# Patient Record
Sex: MALE | Race: WHITE | NOT HISPANIC OR LATINO | Employment: OTHER | URBAN - METROPOLITAN AREA
[De-identification: names, ages, dates, MRNs, and addresses within clinical notes are randomized per-mention and may not be internally consistent; named-entity substitution may affect disease eponyms.]

---

## 2017-01-03 DIAGNOSIS — G89.29 CHRONIC PAIN OF LEFT KNEE: ICD-10-CM

## 2017-01-03 DIAGNOSIS — R29.898 WEAKNESS OF LEFT LOWER EXTREMITY: Primary | ICD-10-CM

## 2017-01-03 DIAGNOSIS — M25.562 CHRONIC PAIN OF LEFT KNEE: ICD-10-CM

## 2017-01-03 DIAGNOSIS — R26.2 DIFFICULTY WALKING: ICD-10-CM

## 2017-01-03 DIAGNOSIS — Z96.652 S/P REVISION OF TOTAL KNEE, LEFT: ICD-10-CM

## 2017-01-09 ENCOUNTER — PATIENT MESSAGE (OUTPATIENT)
Dept: ORTHOPEDICS | Facility: CLINIC | Age: 57
End: 2017-01-09

## 2017-01-27 ENCOUNTER — CLINICAL SUPPORT (OUTPATIENT)
Dept: REHABILITATION | Facility: HOSPITAL | Age: 57
End: 2017-01-27
Attending: ORTHOPAEDIC SURGERY
Payer: OTHER MISCELLANEOUS

## 2017-01-27 DIAGNOSIS — Z74.09 IMPAIRED FUNCTIONAL MOBILITY, BALANCE, GAIT, AND ENDURANCE: ICD-10-CM

## 2017-01-27 DIAGNOSIS — G89.29 CHRONIC PAIN OF LEFT KNEE: ICD-10-CM

## 2017-01-27 DIAGNOSIS — M25.562 CHRONIC PAIN OF LEFT KNEE: ICD-10-CM

## 2017-01-27 DIAGNOSIS — Z96.652 S/P REVISION OF TOTAL KNEE, LEFT: ICD-10-CM

## 2017-01-27 DIAGNOSIS — R26.2 DIFFICULTY WALKING: ICD-10-CM

## 2017-01-27 DIAGNOSIS — R53.1 DECREASED STRENGTH: ICD-10-CM

## 2017-01-27 DIAGNOSIS — R29.898 WEAKNESS OF LEFT LOWER EXTREMITY: ICD-10-CM

## 2017-01-27 DIAGNOSIS — M25.662 DECREASED ROM OF LEFT KNEE: ICD-10-CM

## 2017-01-27 DIAGNOSIS — Z78.9 IMPAIRED MOTOR CONTROL: ICD-10-CM

## 2017-01-27 PROCEDURE — 97163 PT EVAL HIGH COMPLEX 45 MIN: CPT | Mod: PO | Performed by: PHYSICAL THERAPIST

## 2017-01-27 NOTE — PROGRESS NOTES
OUTPATIENT NEUROLOGICAL REHABILITATION  PHYSICAL THERAPY EVALUATION    Name: John Macedo  Clinic Number: [id number]    Diagnosis:   Encounter Diagnoses   Name Primary?    Weakness of left lower extremity     S/P revision of total knee, left     Chronic pain of left knee     Difficulty walking      Physician: Alvarez Garcia MD  Treatment Orders: PT Eval and Treat, Bioness trial/ assessment  Past Medical History   Diagnosis Date    Arthritis     GERD (gastroesophageal reflux disease)        Evaluation Date: 1/27/2017  Visit #: 1  Plan of care expiration: 4/21/2017  Precautions: universal      History   Medical Diagnosis: weakness of LLE, difficulty walking, s/p left TKA  PT Diagnosis: impaired gait, decreased strength, decreased ROM, impaired balance, impaired motor control  Chief complaint: impaired ambulation, weakness of LLE, decreased muscular endurance of LLE.  History of Present Illness: John is a 56 y.o. male that presents to [facility] secondary to difficulty regaining strength and returning to normal ambulation s/p left TKA. Left TKA 2016, pulled out pain pump then had significant LE edema.  Pt reports LLE weakness and impaired ambulation as a result. Per pt, EMG reading demonstrates regeneration. Left knee often gives out causing falls. History of falls 1x/day. Pt reports he feels as though leg is weaker. PMHx: Randolph TKA left ~10 yrs ago. Right sided SI joint pain constant pain. ORIF left wrist 2009, facial reconstruction- done in teenage yrs.    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 Works ~5 hrs/day      Subjective   Pt stated goals: "get back to where I was"  Family present/states: "at least stop the pain"  Pain: 7-8/10 LBP and left knee pain    Objective   - Follows commands: yes   - Speech: no deficits     Mental status: alert, oriented to person, place, and time  Appearance: Casually dressed  Behavior:  calm and cooperative  Attention Span and Concentration:  Normal    Dominant hand:  right     Posture Alignment :slouched posture    Sensation:  Light Touch: Intact           Proprioception:   Intact    Tone: 0 - No increase in muscle tone  Limbs/muscles affected: NA    Visual/Auditory: denies changes     Coordination:   - fine motor: NT  - UE coordination: NT    - LE coordination:  WFL for alternate toe taps    ROM:   UPPER EXTREMITY--AROM/PROM  (R) UE: WFLs  (L) UE: WFLs           RANGE OF MOTION--LOWER EXTREMITIES  (R) LE Hip: equal bilaterally   Knee: knee flex in sitting- 123 degrees   Ankle: equal bilaterally    (L) LE: Hip: equal bilaterally   Knee: knee flex in sitting- 85 degrees   Ankle: equal bilaterally    Strength: manual muscle test grades below   Upper Extremity Strength  BUE strength grossly WFLs  Lower Extremity Strength   RLE LLE   Hip Flexion: 4+/5 4/5   Hip Extension:  3+/5 3+/5   Hip Abduction: 5/5 4+/5   Hip Adduction: NT NT   Knee Extension: 5/5 3+/5   Knee Flexion: 5/5 4-/5   Ankle Dorsiflexion: 5/5 5/5   Ankle Plantarflexion: 5/5 5/5   Ankle Inversion: 5/5 5/5   Ankle Eversion: 5/5 5/5        Evaluation   Single Limb Stance R LE 30 sec, steady, light touch on crutch  (<10 sec = HIGH FALL RISK)   Single Limb Stance L LE 30 sec, unsteady, releant on crutch  (<10 sec = HIGH FALL RISK)   30 second Chair Rise NT   5 times sit-stand NT "     Postural control:  static standing balance without support= good  Dynamic standing balance without support= fair  ambulation with 1 Lofstrand= fair    Gait Assessment:   - AD used: 1 loftstrand  - Assistance: mod I  - Distance: ~ ft limit  - Curb: NT  - Ramp:  NT    GAIT DEVIATIONS:  John displays the following deviations with ambulation: por VMO activation initial- mid stance on left, decreased weight bearing LLE, decreased speed, decreased terminal stance on left  Gait with Bioness L300 Plus: improved speed, improved left knee extension in initial contact through mid stance, improved terminal stance on left    Impairments contributing to deviations: impaired motor control, decreased strength, decreased muscular endurance    Endurance Deficit: good for eval, per pt LLE fatigue is evident and worsens throughout the day affecting gait and balance.      Evaluation Bioness L300 Plus   Timed Up and Go 14.17 sec 12.15 sec with 1 loftstrand    Self Selected Walking Speed 0.8 m/sec (6m/7.48s) 0.99 m/s (6m in 6.07 sec)   Fast Walking Speed 0.88 m/sec (6m/6.8s) 1.22 m/s (6m in 4.9 sec)     Functional Mobility (Bed mobility, transfers)  Bed mobility: I  Supine to sit: I  Sit to supine: I  Rolling: I  Transfers to bed: Mod I  Transfers to toilet: Mod I  Sit to stand:  Mod I  Stand pivot:  Mod I  Car transfers: Mod I  Wheelchair mobility: NA  Floor transfers: NT    Written Home Exercises Provided: TBA- pt is expecting to obtain a Bioness L300 Plus unit for home use    Education provided re:role of PT, goals for PT, scheduling - pt verbalized understanding.     John verbalized good understanding of education provided.   Pt has no cultural, educational or language barriers to learning provided.      Assessment   This is a 56 y.o. male referred to outpatient physical therapy and presents with a medical diagnosis of LLE weakness and gait impairments s/p left TKA and demonstrates limitations as described in the  problem list. Due to pt's deficits, pt is currently unable to perform job duties as a , is limited to working 5 hrs or less per day, uses an AD for ambulation, and experiences limited ambulation tolerance. Pt has a history of complications occurring after knee replacement that include swelling and possible nerve damage. Pt expresses anxiety regarding rehab and returning to PLOF. pt has participated in PT in the past with limited gains. Pt reports he feels as though condition is worsening. Pt also reports fluctuating status of ambulation with intermittent left knee buckling resulting in loss of balance. Pt's condition is considered unstable. PT is warranted to address impairments via use of [employer] L300 Plus system to improve gait consistency and decrease need for AD.    Pt rehab potential is Good. Pt will benefit from continuing skilled outpatient physical therapy to address the deficits listed below in the problem list, provide pt/family education and to maximize pt's level of independence in the home and community environment.       Medical necessity is demonstrated by the following IMPAIRMENTS/PROMBLEM LIST:   1. Fall Risk - impaired balance   2. Weakness   3. Decreased ROM  4. Gait deviations   5. Decreased ambulation   6. Decreased activity tolerance   7. Difficulty participating in daily activities   8. Continued inability to participate fully in vocational pursuits   9. Requires skilled supervision to complete and progress HEP     Anticipated barriers to physical therapy: PMHx, history of previous PT, anxiety about returning to PLOF    Pt's spiritual, cultural and educational needs considered and pt agreeable to plan of care and goals as stated below:     GOALS:   Short term goals: 4-6 weeks, pt agrees to goals set.  1. Pt will perform self ROM for knee flexion to improve mobility to correct gait impairments.  2. Pt will demonstrate improved left knee flexion AAROM in sitting to 95 degrees to  improve LE mobility for mobility (i.e. Sit<>stand, stairs).  3. Pt will perform TUG with Bioness L300 Plus and Lofstrand crutch in 10 sec to demonstrate improved mobility and decreased fall risk.   4. Pt will demonstrate improved bilateral hip extension strength to 4/5 to improve balance and ambulation.  5. Pt will report decreased left knee pain average to 5/10 with weight bearing to improve tolerance to mobility.  6. Assess ability to negotiate curbs and ramps and set goals as needed.     Long term goals: 8-12 weeks, pt agrees to goals set  7. 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  8. Pt will demonstrate improved ambulation velocity to 1.24 m/s to demonstrate a 25% improvement in speed/ mobility.   9. Pt will ambulate at least 300 ft with Bioness L300 Plus with or without Lofstrand to demonstrate improved ability to negotiate community distances.  10. Pt will demonstrate improved left knee strength to 4+/5 to improve all mobility, decrease risk of knee buckling, and decrease need for AD for ambulation.  11. Pt will report decreased left knee pain average to 3/10 or less to demonstrate improved tolerance to all mobility.      Plan   Outpatient physical therapy 2-3 times weekly for 8-12 weeks to include: Pt Education, HEP, therapeutic exercises, gait training, neuromuscular re-education, therapeutic activities, manual therapy, joint mobilizations, and modalities PRN to achieve established goals. Pt may be seen by PTA as part of the rehabilitation team.       La Pickens, PT  01/27/2017      I certify the need for these services furnished under this plan of treatment and while under my care.  ____________________________________ Physician/Referring Practitioner   Date of Signature

## 2017-01-31 ENCOUNTER — CLINICAL SUPPORT (OUTPATIENT)
Dept: REHABILITATION | Facility: HOSPITAL | Age: 57
End: 2017-01-31
Attending: ORTHOPAEDIC SURGERY
Payer: OTHER MISCELLANEOUS

## 2017-01-31 DIAGNOSIS — M25.662 DECREASED ROM OF LEFT KNEE: ICD-10-CM

## 2017-01-31 DIAGNOSIS — Z74.09 IMPAIRED FUNCTIONAL MOBILITY, BALANCE, GAIT, AND ENDURANCE: ICD-10-CM

## 2017-01-31 DIAGNOSIS — R53.1 DECREASED STRENGTH: ICD-10-CM

## 2017-01-31 DIAGNOSIS — Z78.9 IMPAIRED MOTOR CONTROL: ICD-10-CM

## 2017-01-31 PROCEDURE — 97116 GAIT TRAINING THERAPY: CPT | Mod: PO | Performed by: PHYSICAL THERAPIST

## 2017-01-31 PROCEDURE — 97110 THERAPEUTIC EXERCISES: CPT | Mod: PO | Performed by: PHYSICAL THERAPIST

## 2017-01-31 NOTE — PROGRESS NOTES
Physical Therapy Progress Note     Name: John Macedo  Clinic Number: [id number]  Diagnosis:   Weakness of left lower extremity       S/P revision of total knee, left      Chronic pain of left knee      Difficulty walking        Physician: Alvarez Garcia MD  Treatment Orders: PT Eval and Treat, Bioness trial/ assessment  Past Medical History   Diagnosis Date    Arthritis     GERD (gastroesophageal reflux disease)        Precautions: standard  Visit #: 2  Date of Eval: 1/27/2017  Plan of Care Expiration: 4/21/2017        Subjective   Pt reports: no new complaints. Pt scheduled with 2nd pt also using Bioness- only used 1/2 of sesssion  Pain Scale:  Same- 7/10 left knee with weight bearing    Objective     Patient received individual therapy to increase strength, endurance, ROM, flexibility and ambualtion with activities as follows:     Pt participated in gait training x 20 minutes to improve gait technique and consistency:  Bioness L300 Plus training= 182 steps with 1 loft strand with supervision- mod I    Pt participated in therapeutic exercises x 25 minutes to improve motor control and strength to improve gait:   Left SLR with intermittent use of 4# with NMES L-38 (4 pads) PPR 1- x 10 minutes  Prone HS curls 2# 20x  Prone hip ext 2# 20x      Written Home Exercises: to be provided- will review wall slides to improve left knee flexion    Education provided re: POC, HEP    Pt has no cultural, educational or language barriers to learning provided.    Assessment   John tolerated treatment well. He was able to complete more steps today with use of Bioness L300 Plus unit. Bioness device was not available for performing training modes, but training mode was set. NMES unit was used to assist pt with terminal left knee extension for SLR. Pt also participated in strengthening of hip ext and knee flex on left for improved gait and balance. Pt can benefit from  continued use of Bioness L300 plus for gait and LE strengthening to allo pt to return to PLOF and decrease need for AD.     Pt prognosis is Good. Pt will continue to benefit from skilled outpatient physical therapy to address the deficits listed in the problem list, provide pt/family education and to maximize pt's level of independence in the home and community environment.     Anticipated barriers to physical therapy: PMHx, history of previous PT, anxiety about returning to PLOF    Medical necessity is demonstrated by the following IMPAIRMENTS/PROBLEM LIST:   Fall Risk - impaired balance   Weakness   Decreased ROM  Gait deviations   Decreased ambulation   Decreased activity tolerance   Difficulty participating in daily activities  Continued inability to participate fully in vocational pursuits   Requires skilled supervision to complete and progress HEP      Pt's spiritual, cultural and educational needs considered and pt agreeable to plan of care and GOALS as stated below:   Short term goals: 4-6 weeks, pt agrees to goals set.  1. Pt will perform self ROM for knee flexion to improve mobility to correct gait impairments.  2. Pt will demonstrate improved left knee flexion AAROM in sitting to 95 degrees to improve LE mobility for mobility (i.e. Sit<>stand, stairs).  3. Pt will perform TUG with Bioness L300 Plus and Lofstrand crutch in 10 sec to demonstrate improved mobility and decreased fall risk.   4. Pt will demonstrate improved bilateral hip extension strength to 4/5 to improve balance and ambulation.  5. Pt will report decreased left knee pain average to 5/10 with weight bearing to improve tolerance to mobility.  6. Assess ability to negotiate curbs and ramps and set goals as needed.      Long term goals: 8-12 weeks, pt agrees to goals set  7. 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  8. Pt will  demonstrate improved ambulation velocity to 1.24 m/s to demonstrate a 25% improvement in speed/ mobility.   9. Pt will ambulate at least 300 ft with [employer] L300 Plus with or without Lofstrand to demonstrate improved ability to negotiate community distances.  10. Pt will demonstrate improved left knee strength to 4+/5 to improve all mobility, decrease risk of knee buckling, and decrease need for AD for ambulation.  11. Pt will report decreased left knee pain average to 3/10 or less to demonstrate improved tolerance to all mobility     Plan   Continue PT 2-3x weekly under established Plan of Care, with treatment to include: pt education, HEP, therapeutic exercises, neuromuscular re-education/balance exercises, therapeutic activities, NMES, joint mobilizations PRN, manual therapy PRN, and modalities PRN, to work towards established goals. Pt may be seen by PTA to carry out plan of care.     La Pickens, PT   01/31/2017

## 2017-02-02 PROBLEM — R53.1 DECREASED STRENGTH: Status: ACTIVE | Noted: 2017-02-02

## 2017-02-02 PROBLEM — Z78.9 IMPAIRED MOTOR CONTROL: Chronic | Status: ACTIVE | Noted: 2017-02-02

## 2017-02-02 PROBLEM — M25.662 DECREASED ROM OF LEFT KNEE: Chronic | Status: ACTIVE | Noted: 2017-02-02

## 2017-02-02 PROBLEM — M25.662 DECREASED ROM OF LEFT KNEE: Status: ACTIVE | Noted: 2017-02-02

## 2017-02-02 PROBLEM — R53.1 DECREASED STRENGTH: Chronic | Status: ACTIVE | Noted: 2017-02-02

## 2017-02-02 PROBLEM — Z74.09 IMPAIRED FUNCTIONAL MOBILITY, BALANCE, GAIT, AND ENDURANCE: Status: ACTIVE | Noted: 2017-02-02

## 2017-02-02 PROBLEM — Z78.9 IMPAIRED MOTOR CONTROL: Status: ACTIVE | Noted: 2017-02-02

## 2017-02-02 PROBLEM — Z74.09 IMPAIRED FUNCTIONAL MOBILITY, BALANCE, GAIT, AND ENDURANCE: Chronic | Status: ACTIVE | Noted: 2017-02-02

## 2017-02-02 NOTE — PLAN OF CARE
OUTPATIENT NEUROLOGICAL REHABILITATION  PHYSICAL THERAPY EVALUATION    Name: John Macedo  Clinic Number: [id number]    Diagnosis:   Encounter Diagnoses   Name Primary?    Weakness of left lower extremity     S/P revision of total knee, left     Chronic pain of left knee     Difficulty walking      Physician: Alvarez Garcia MD  Treatment Orders: PT Eval and Treat, Bioness trial/ assessment  Past Medical History   Diagnosis Date    Arthritis     GERD (gastroesophageal reflux disease)        Evaluation Date: 1/27/2017  Visit #: 1  Plan of care expiration: 4/21/2017  Precautions: universal      History   Medical Diagnosis: weakness of LLE, difficulty walking, s/p left TKA  PT Diagnosis: impaired gait, decreased strength, decreased ROM, impaired balance, impaired motor control  Chief complaint: impaired ambulation, weakness of LLE, decreased muscular endurance of LLE.  History of Present Illness: John is a 56 y.o. male that presents to [facility] secondary to difficulty regaining strength and returning to normal ambulation s/p left TKA. Left TKA 2016, pulled out pain pump then had significant LE edema.  Pt reports LLE weakness and impaired ambulation as a result. Per pt, EMG reading demonstrates regeneration. Left knee often gives out causing falls. History of falls 1x/day. Pt reports he feels as though leg is weaker. PMHx: Randolph TKA left ~10 yrs ago. Right sided SI joint pain constant pain. ORIF left wrist 2009, facial reconstruction- done in teenage yrs.      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 Works ~5 hrs/day      Subjective   Pt stated goals: "get back to where I was"  Family present/states: "at least stop the pain"  Pain: 7-8/10 LBP and left knee pain    Objective   - Follows commands: yes   - Speech: no deficits     Mental status: alert, oriented to person, place, and time  Appearance: Casually dressed  Behavior:  calm and cooperative  Attention Span and Concentration:  Normal    Dominant hand:  right     Posture Alignment :slouched posture    Sensation:  Light Touch: Intact           Proprioception:   Intact    Tone: 0 - No increase in muscle tone  Limbs/muscles affected: NA    Visual/Auditory: denies changes     Coordination:   - fine motor: NT  - UE coordination: NT    - LE coordination:  WFL for alternate toe taps    ROM:   UPPER EXTREMITY--AROM/PROM  (R) UE: WFLs  (L) UE: WFLs           RANGE OF MOTION--LOWER EXTREMITIES  (R) LE Hip: equal bilaterally   Knee: knee flex in sitting- 123 degrees   Ankle: equal bilaterally    (L) LE: Hip: equal bilaterally   Knee: knee flex in sitting- 85 degrees   Ankle: equal bilaterally    Strength: manual muscle test grades below   Upper Extremity Strength  BUE strength grossly WFLs  Lower Extremity Strength   RLE LLE   Hip Flexion: 4+/5 4/5   Hip Extension:  3+/5 3+/5   Hip Abduction: 5/5 4+/5   Hip Adduction: NT NT   Knee Extension: 5/5 3+/5   Knee Flexion: 5/5 4-/5   Ankle Dorsiflexion: 5/5 5/5   Ankle Plantarflexion: 5/5 5/5   Ankle Inversion: 5/5 5/5   Ankle Eversion: 5/5 5/5        Evaluation   Single Limb Stance R LE 30 sec, steady, light touch on crutch  (<10 sec = HIGH FALL RISK)   Single Limb Stance L LE 30 sec, unsteady, releant on crutch  (<10 sec = HIGH FALL RISK)   30 second Chair Rise NT   5 times sit-stand NT "     Postural control:  static standing balance without support= good  Dynamic standing balance without support= fair  ambulation with 1 Lofstrand= fair    Gait Assessment:   - AD used: 1 loftstrand  - Assistance: mod I  - Distance: ~ ft limit  - Curb: NT  - Ramp:  NT    GAIT DEVIATIONS:  John displays the following deviations with ambulation: por VMO activation initial- mid stance on left, decreased weight bearing LLE, decreased speed, decreased terminal stance on left  Gait with Bioness L300 Plus: improved speed, improved left knee extension in initial contact through mid stance, improved terminal stance on left    Impairments contributing to deviations: impaired motor control, decreased strength, decreased muscular endurance    Endurance Deficit: good for eval, per pt LLE fatigue is evident and worsens throughout the day affecting gait and balance.      Evaluation Bioness L300 Plus   Timed Up and Go 14.17 sec 12.15 sec with 1 loftstrand    Self Selected Walking Speed 0.8 m/sec (6m/7.48s) 0.99 m/s (6m in 6.07 sec)   Fast Walking Speed 0.88 m/sec (6m/6.8s) 1.22 m/s (6m in 4.9 sec)     Functional Mobility (Bed mobility, transfers)  Bed mobility: I  Supine to sit: I  Sit to supine: I  Rolling: I  Transfers to bed: Mod I  Transfers to toilet: Mod I  Sit to stand:  Mod I  Stand pivot:  Mod I  Car transfers: Mod I  Wheelchair mobility: NA  Floor transfers: NT    Written Home Exercises Provided: TBA- pt is expecting to obtain a Bioness L300 Plus unit for home use    Education provided re:role of PT, goals for PT, scheduling - pt verbalized understanding.     John verbalized good understanding of education provided.   Pt has no cultural, educational or language barriers to learning provided.      Assessment   This is a 56 y.o. male referred to outpatient physical therapy and presents with a medical diagnosis of LLE weakness and gait impairments s/p left TKA and demonstrates limitations as described in the  problem list. Due to pt's deficits, pt is currently unable to perform job duties as a , is limited to working 5 hrs or less per day, uses an AD for ambulation, and experiences limited ambulation tolerance. Pt has a history of complications occurring after knee replacement that include swelling and possible nerve damage. Pt expresses anxiety regarding rehab and returning to PLOF. pt has participated in PT in the past with limited gains. Pt reports he feels as though condition is worsening. Pt also reports fluctuating status of ambulation with intermittent left knee buckling resulting in loss of balance. Pt's condition is considered unstable. PT is warranted to address impairments via use of [employer] L300 Plus system to improve gait consistency and decrease need for AD.    Pt rehab potential is Good. Pt will benefit from continuing skilled outpatient physical therapy to address the deficits listed below in the problem list, provide pt/family education and to maximize pt's level of independence in the home and community environment.       Medical necessity is demonstrated by the following IMPAIRMENTS/PROMBLEM LIST:   1. Fall Risk - impaired balance   2. Weakness   3. Decreased ROM  4. Gait deviations   5. Decreased ambulation   6. Decreased activity tolerance   7. Difficulty participating in daily activities   8. Continued inability to participate fully in vocational pursuits   9. Requires skilled supervision to complete and progress HEP     Anticipated barriers to physical therapy: PMHx, history of previous PT, anxiety about returning to PLOF    Pt's spiritual, cultural and educational needs considered and pt agreeable to plan of care and goals as stated below:     GOALS:   Short term goals: 4-6 weeks, pt agrees to goals set.  1. Pt will perform self ROM for knee flexion to improve mobility to correct gait impairments.  2. Pt will demonstrate improved left knee flexion AAROM in sitting to 95 degrees to  improve LE mobility for mobility (i.e. Sit<>stand, stairs).  3. Pt will perform TUG with Bioness L300 Plus and Lofstrand crutch in 10 sec to demonstrate improved mobility and decreased fall risk.   4. Pt will demonstrate improved bilateral hip extension strength to 4/5 to improve balance and ambulation.  5. Pt will report decreased left knee pain average to 5/10 with weight bearing to improve tolerance to mobility.  6. Assess ability to negotiate curbs and ramps and set goals as needed.     Long term goals: 8-12 weeks, pt agrees to goals set  7. 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  8. Pt will demonstrate improved ambulation velocity to 1.24 m/s to demonstrate a 25% improvement in speed/ mobility.   9. Pt will ambulate at least 300 ft with Bioness L300 Plus with or without Lofstrand to demonstrate improved ability to negotiate community distances.  10. Pt will demonstrate improved left knee strength to 4+/5 to improve all mobility, decrease risk of knee buckling, and decrease need for AD for ambulation.  11. Pt will report decreased left knee pain average to 3/10 or less to demonstrate improved tolerance to all mobility.      Plan   Outpatient physical therapy 2-3 times weekly for 8-12 weeks to include: Pt Education, HEP, therapeutic exercises, gait training, neuromuscular re-education, therapeutic activities, manual therapy, joint mobilizations, and modalities PRN to achieve established goals. Pt may be seen by PTA as part of the rehabilitation team.       La Pickens, PT  01/27/2017      I certify the need for these services furnished under this plan of treatment and while under my care.  ____________________________________ Physician/Referring Practitioner   Date of Signature

## 2017-02-03 ENCOUNTER — DOCUMENTATION ONLY (OUTPATIENT)
Dept: REHABILITATION | Facility: HOSPITAL | Age: 57
End: 2017-02-03

## 2017-02-03 DIAGNOSIS — R26.2 DIFFICULTY WALKING: ICD-10-CM

## 2017-02-03 DIAGNOSIS — M62.81 MUSCLE WEAKNESS: ICD-10-CM

## 2017-02-03 DIAGNOSIS — M25.662 STIFFNESS OF LEFT KNEE: ICD-10-CM

## 2017-02-03 DIAGNOSIS — G89.29 CHRONIC PAIN OF LEFT KNEE: Primary | ICD-10-CM

## 2017-02-03 DIAGNOSIS — M25.562 CHRONIC PAIN OF LEFT KNEE: Primary | ICD-10-CM

## 2017-02-03 NOTE — PROGRESS NOTES
Discharge Note    Name:John Macedo  Physician: Dr. Garcia  Date of eval:11/29/2016  Orders: Physical Therapy evaluate and treat   Clinic: [id number]  Diagnosis:  1. Chronic pain of left knee      2. Stiffness of left knee      3. Difficulty walking      4. Muscle weakness            Precautions: history of LBP  Evaluation date: 5-5-16  Visit # authorized: 47/47 on 11-29-16  Authorization period: 5-4-17  MD Follow up appt: 11-16-16     Subjective      Pt reports: he had been out of town and had a long trip so feeling increased pain in knee and back. Pt states weakness in knee is still CC and gets swelling. Pt states as long as he does the FES he is able to get better contraction and when he does not do stim consistently the muscle fades out and takes more to get good contraction. Pt reports having 1-11/2 day improvement after strain counterstrain treatment, but gradually worsened again  Pain scale: 4/10 knee 6-7/10 back to start At end feels better  Objective   Muscle Strength 11-29-16  MMT R L   Hip flexion 5/5 4-/5   Hip abduction 5/5 4+/5   Hip extension 5/5 5-/5   Glut max 5/5 5-/5           Knee extension 5/5 4-/5   Knee flexion 5/5 4/5      90/90 20 B     Knee ROM: (measured in degrees) prior to treatment 11-29-16  Knee   (L)   Flexion   95   Extension   -5            Knee ROM: (measured in degrees)after treatment  Knee (R) (L)   Flexion 130 110 on 11-10-16   Extension 5 0              Knee Girth: (measured in centimeters)   Knee   RLE LLE(EVAL) LLE 11-29-16 LLE9-29-16   Mid joint   45.4 48.1 47.5 47.0      AR R pelvis which leveled with push/pull      Sit to stand 8 in 30 sec  TREATMENT:     Therapeutic exercises were performed to improve ROM, strength, flexibility and stabilization for 25 min  Instructed pt to continue work on FES and strengthening as tolerated.   Pt continues to work on stretching several times a day to maintain knee extension and continue FES as needed.  [facility]  Pt works on  bike, elliptical and treadmill   Quad machine and HS machine Leg press, mule pull with resisted   Balancing on one leg Instructed pt to progress to moving and someone throwing things at him to progress stability  Heel raises for calf strengthening and ankle press and reports seeing improvement  SLR X 4 at home with ankle weights as able   SAQ     Assessment   Pt is unable to maintain progress after PT treatments and continues with muscle strength issues as related to neuropathy and may benefit from neuro PT consult to assess additional considerations for neuro strengthening.      GOALS:   Short Term Goals: 3 weeks  Increase range of motion 25% MET  Increase strength 1/2 muscle grade  MET  Be able to perform HEP with minimal cueing required MET  TUG 20 sec or below MET  Sit to stand score 10 or above  MET  Improve functional impairment of mobility to 40/100     Long Term Goals: 6 weeks   Increase range of motion to 75% to 100% full PARTIALLY MET  Improve muscle strength 1 muscle grade PARTIALLY MET  Improve muscle strength with MMT to 4+/5 to 5/5 PARTIALLY MET  Restore ability to ambulate with normal pain free gait PARTIALLY MET  Walking for ADL and exercise will be restored without increased pain PARTIALLY MET  Restore ability to stand for ADL without increased pain PARTIALLY MET  Restore ability to perform sit to stand transfer without increased pain PARTIALLY MET  Restore ability to climb stairs in a reciprocal manner with min to 0 increased pain and/or difficulty PARTIALLY MET  Restore ability to drive PARTIALLY MET. Limited with prolonged driving  TUG 13 sec or less  MET  Sit to stand score 15 or above PARTIALLY MET  Restore ability to perform ADL's and household activities independently and without increased pain PARTIALLY MET  Improve functional impairment of mobility to 55/100 PARTIALLY MET     Plan   Evaluation for Bioness

## 2017-02-09 ENCOUNTER — PATIENT MESSAGE (OUTPATIENT)
Dept: ORTHOPEDICS | Facility: CLINIC | Age: 57
End: 2017-02-09

## 2017-02-09 ENCOUNTER — TELEPHONE (OUTPATIENT)
Dept: ORTHOPEDICS | Facility: CLINIC | Age: 57
End: 2017-02-09

## 2017-02-09 ENCOUNTER — CLINICAL SUPPORT (OUTPATIENT)
Dept: REHABILITATION | Facility: HOSPITAL | Age: 57
End: 2017-02-09
Attending: ORTHOPAEDIC SURGERY
Payer: OTHER MISCELLANEOUS

## 2017-02-09 DIAGNOSIS — Z74.09 IMPAIRED FUNCTIONAL MOBILITY, BALANCE, GAIT, AND ENDURANCE: ICD-10-CM

## 2017-02-09 DIAGNOSIS — M25.662 DECREASED ROM OF LEFT KNEE: ICD-10-CM

## 2017-02-09 DIAGNOSIS — R53.1 DECREASED STRENGTH: ICD-10-CM

## 2017-02-09 DIAGNOSIS — Z78.9 IMPAIRED MOTOR CONTROL: ICD-10-CM

## 2017-02-09 PROCEDURE — 97116 GAIT TRAINING THERAPY: CPT | Mod: PO | Performed by: PHYSICAL THERAPIST

## 2017-02-09 PROCEDURE — 97110 THERAPEUTIC EXERCISES: CPT | Mod: PO | Performed by: PHYSICAL THERAPIST

## 2017-02-09 NOTE — PROGRESS NOTES
Physical Therapy Progress Note     Name: John Macedo  Clinic Number: [id number]  Diagnosis:   Weakness of left lower extremity       S/P revision of total knee, left      Chronic pain of left knee      Difficulty walking        Physician: Alvarez Garcia MD  Treatment Orders: PT Eval and Treat, Bioness trial/ assessment  Past Medical History   Diagnosis Date    Arthritis     GERD (gastroesophageal reflux disease)        Precautions: standard  Visit #: 3  Date of Eval: 1/27/2017  Plan of Care Expiration: 4/21/2017        Subjective   Pt reports: pt reports LLE feels weak today, worried about buckling even with use of crutch.  Pain Scale:  LBP and left knee pain, not rated    Objective     Patient received individual therapy to increase strength, endurance, ROM, flexibility and ambualtion with activities as follows:     Pt participated in gait training x 18 minutes to improve gait technique and consistency:  Bioness L300 Plus training= 600 ft + 300 ft  Total steps= 340 steps    Pt participated in therapeutic exercises x 42 minutes to improve motor control and strength to improve gait:   Nustep BUE/LE L7 x 8 minutes  Left SLR 3#- Bioness training mode    Prone HS curls 3# 20x  Prone hip ext 3# 20x  Left knee flex in sitting 98 degrees  Heels slides on wall AAROM on left 10x    Written Home Exercises: 2/9/17: issued wall slides to improve knee flexion, prone HS curls, and prone hip hip extension    Education provided re: POC, HEP    Pt has no cultural, educational or language barriers to learning provided.    Assessment   John tolerated treatment well. Pt reports Bioness L300 Plus makes LLE feel more stable during weight bearing and ambualtion. Pt was able to ambulate for longer duration on the first attempt with Bioness. Fatigue noted after initial 600 ft, decreased left knee stability and decreased speed noted. Pt was able to tolerate recumbent stepper  with moderate resistance for strengthening and ROM purposes. Pt demonstrated a good improvement in left knee flexion ROM, but does not have adequate flexion for stair climbing and appropriate sit<>stand. Pt can benefit from continued use of Bioness L300 plus for gait and LE strengthening to allow pt to return to PLOF and decrease need for AD.     Pt prognosis is Good. Pt will continue to benefit from skilled outpatient physical therapy to address the deficits listed in the problem list, provide pt/family education and to maximize pt's level of independence in the home and community environment.     Anticipated barriers to physical therapy: PMHx, history of previous PT, anxiety about returning to PLOF    Medical necessity is demonstrated by the following IMPAIRMENTS/PROBLEM LIST:   Fall Risk - impaired balance   Weakness   Decreased ROM  Gait deviations   Decreased ambulation   Decreased activity tolerance   Difficulty participating in daily activities  Continued inability to participate fully in vocational pursuits   Requires skilled supervision to complete and progress HEP      Pt's spiritual, cultural and educational needs considered and pt agreeable to plan of care and GOALS as stated below:   Short term goals: 4-6 weeks, pt agrees to goals set.  1. Pt will perform self ROM for knee flexion to improve mobility to correct gait impairments.  2. Pt will demonstrate improved left knee flexion AAROM in sitting to 95 degrees to improve LE mobility for mobility (i.e. Sit<>stand, stairs).  3. Pt will perform TUG with Bioness L300 Plus and Lofstrand crutch in 10 sec to demonstrate improved mobility and decreased fall risk.   4. Pt will demonstrate improved bilateral hip extension strength to 4/5 to improve balance and ambulation.  5. Pt will report decreased left knee pain average to 5/10 with weight bearing to improve tolerance to mobility.  6. Assess ability to negotiate curbs and ramps and set goals as needed.       Long term goals: 8-12 weeks, pt agrees to goals set  7. 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  8. Pt will demonstrate improved ambulation velocity to 1.24 m/s to demonstrate a 25% improvement in speed/ mobility.   9. Pt will ambulate at least 300 ft with [employer] L300 Plus with or without Lofstrand to demonstrate improved ability to negotiate community distances.  10. Pt will demonstrate improved left knee strength to 4+/5 to improve all mobility, decrease risk of knee buckling, and decrease need for AD for ambulation.  11. Pt will report decreased left knee pain average to 3/10 or less to demonstrate improved tolerance to all mobility     Plan   Continue PT 2-3x weekly under established Plan of Care, with treatment to include: pt education, HEP, therapeutic exercises, neuromuscular re-education/balance exercises, therapeutic activities, NMES, joint mobilizations PRN, manual therapy PRN, and modalities PRN, to work towards established goals. Pt may be seen by PTA to carry out plan of care.     La Pickens, PT   02/09/2017

## 2017-02-09 NOTE — TELEPHONE ENCOUNTER
Spoke to pt and he was notified the paperwork was faxed over to [employer]. Pt was pleased. I also spoke to Katie with [employer] and she confirmed the fax number.

## 2017-02-13 ENCOUNTER — CLINICAL SUPPORT (OUTPATIENT)
Dept: REHABILITATION | Facility: HOSPITAL | Age: 57
End: 2017-02-13
Attending: ORTHOPAEDIC SURGERY
Payer: OTHER MISCELLANEOUS

## 2017-02-13 ENCOUNTER — DOCUMENTATION ONLY (OUTPATIENT)
Dept: REHABILITATION | Facility: HOSPITAL | Age: 57
End: 2017-02-13

## 2017-02-13 DIAGNOSIS — M25.662 DECREASED ROM OF LEFT KNEE: Primary | Chronic | ICD-10-CM

## 2017-02-13 DIAGNOSIS — R53.1 DECREASED STRENGTH: Chronic | ICD-10-CM

## 2017-02-13 DIAGNOSIS — Z74.09 IMPAIRED FUNCTIONAL MOBILITY, BALANCE, GAIT, AND ENDURANCE: Chronic | ICD-10-CM

## 2017-02-13 DIAGNOSIS — Z78.9 IMPAIRED MOTOR CONTROL: Chronic | ICD-10-CM

## 2017-02-13 PROCEDURE — 97116 GAIT TRAINING THERAPY: CPT | Mod: PO

## 2017-02-13 PROCEDURE — 97112 NEUROMUSCULAR REEDUCATION: CPT | Mod: PO

## 2017-02-13 NOTE — PROGRESS NOTES
I, LEE ToureT, met with Naomi Newman PTA to discuss this pt's POC. Pt continues with Bioness training. Pt is demonstrating increased tolerance to ambulation with device on. Increase distances as able. Perform SLR and/ or SAQ in training mode. Pt demonstrates decreased left knee flexion, address as needed. Also perform strengthening of left HS and gluts.    La Pickens DPT  2/13/2017    Face to face consultation with supervision PT held on 02/13/2017    Naomi Newman PTA

## 2017-02-13 NOTE — PROGRESS NOTES
"                                                    Physical Therapy Progress Note     Name: John Macedo  Clinic Number: [id number]  Diagnosis:   Weakness of left lower extremity       S/P revision of total knee, left      Chronic pain of left knee      Difficulty walking        Physician: Alvarez Garcia MD  Treatment Orders: PT Eval and Treat, Bioness trial/ assessment  Past Medical History   Diagnosis Date    Arthritis     GERD (gastroesophageal reflux disease)        Precautions: standard  Visit #: 4  Date of Eval: 1/27/2017  Plan of Care Expiration: 4/21/2017        Subjective   Pt reports: " I think I did to much this weekend.  I was working in the garden and I feel it."   Pain Scale: 7/10 LBP and 5/10 left knee pain     Objective     Patient received individual therapy to increase strength, endurance, ROM, flexibility and ambualtion with activities as follows:     Pt participated in gait training x 15 minutes to improve gait technique and consistency:  Bioness L300 Plus training  Total steps= 194 steps    Pt participated in therapeutic exercises x 30 minutes to improve motor control and strength to improve gait:   Nustep BUE/LE L7 x 6 minutes  Bioness training mode ( 5 sec on/5 sec off)  Left SLR 3# 2 x 10 reps  L SAQ 3#  2 x 10 reps      Written Home Exercises: 2/9/17: issued wall slides to improve knee flexion, prone HS curls, and prone hip hip extension    Education provided re: POC, HEP    Pt has no cultural, educational or language barriers to learning provided.    Assessment   John tolerated treatment well and did not have any complaints.  Pt cont to have good success with Bioness L 300 plus.  Pt began SAQ with #3lb cuff weights applied.   No problems noted. Cont with POC.    Pt prognosis is Good. Pt will continue to benefit from skilled outpatient physical therapy to address the deficits listed in the problem list, provide pt/family education and to maximize pt's level of independence in " the home and community environment.     Anticipated barriers to physical therapy: PMHx, history of previous PT, anxiety about returning to PLOF    Medical necessity is demonstrated by the following IMPAIRMENTS/PROBLEM LIST:   Fall Risk - impaired balance   Weakness   Decreased ROM  Gait deviations   Decreased ambulation   Decreased activity tolerance   Difficulty participating in daily activities  Continued inability to participate fully in vocational pursuits   Requires skilled supervision to complete and progress HEP      Pt's spiritual, cultural and educational needs considered and pt agreeable to plan of care and GOALS as stated below:   Short term goals: 4-6 weeks, pt agrees to goals set.  1. Pt will perform self ROM for knee flexion to improve mobility to correct gait impairments.  2. Pt will demonstrate improved left knee flexion AAROM in sitting to 95 degrees to improve LE mobility for mobility (i.e. Sit<>stand, stairs).  3. Pt will perform TUG with Bioness L300 Plus and Lofstrand crutch in 10 sec to demonstrate improved mobility and decreased fall risk.   4. Pt will demonstrate improved bilateral hip extension strength to 4/5 to improve balance and ambulation.  5. Pt will report decreased left knee pain average to 5/10 with weight bearing to improve tolerance to mobility.  6. Assess ability to negotiate curbs and ramps and set goals as needed.      Long term goals: 8-12 weeks, pt agrees to goals set  7. 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  8. Pt will demonstrate improved ambulation velocity to 1.24 m/s to demonstrate a 25% improvement in speed/ mobility.   9. Pt will ambulate at least 300 ft with Bioness L300 Plus with or without Lofstrand to demonstrate improved ability to negotiate community distances.  10. Pt will demonstrate improved left knee strength to 4+/5 to improve all mobility,  decrease risk of knee buckling, and decrease need for AD for ambulation.  11. Pt will report decreased left knee pain average to 3/10 or less to demonstrate improved tolerance to all mobility     Plan   Continue PT 2-3x weekly under established Plan of Care, with treatment to include: pt education, HEP, therapeutic exercises, neuromuscular re-education/balance exercises, therapeutic activities, NMES, joint mobilizations PRN, manual therapy PRN, and modalities PRN, to work towards established goals. Pt may be seen by PTA to carry out plan of care.     Naomi Newman, PTA   02/13/2017

## 2017-02-14 DIAGNOSIS — M25.562 LEFT KNEE PAIN, UNSPECIFIED CHRONICITY: Primary | ICD-10-CM

## 2017-02-15 ENCOUNTER — OFFICE VISIT (OUTPATIENT)
Dept: ORTHOPEDICS | Facility: CLINIC | Age: 57
End: 2017-02-15
Payer: OTHER MISCELLANEOUS

## 2017-02-15 ENCOUNTER — HOSPITAL ENCOUNTER (OUTPATIENT)
Dept: RADIOLOGY | Facility: HOSPITAL | Age: 57
Discharge: HOME | End: 2017-02-15
Attending: ORTHOPAEDIC SURGERY
Payer: OTHER MISCELLANEOUS

## 2017-02-15 VITALS
BODY MASS INDEX: 36.37 KG/M2 | HEIGHT: 68 IN | DIASTOLIC BLOOD PRESSURE: 90 MMHG | SYSTOLIC BLOOD PRESSURE: 138 MMHG | HEART RATE: 76 BPM | WEIGHT: 240 LBS | RESPIRATION RATE: 18 BRPM

## 2017-02-15 DIAGNOSIS — G89.29 CHRONIC PAIN OF LEFT KNEE: ICD-10-CM

## 2017-02-15 DIAGNOSIS — M25.562 LEFT KNEE PAIN, UNSPECIFIED CHRONICITY: ICD-10-CM

## 2017-02-15 DIAGNOSIS — R26.2 DIFFICULTY WALKING: ICD-10-CM

## 2017-02-15 DIAGNOSIS — M25.562 CHRONIC PAIN OF LEFT KNEE: ICD-10-CM

## 2017-02-15 DIAGNOSIS — Z96.652 S/P REVISION OF TOTAL KNEE, LEFT: ICD-10-CM

## 2017-02-15 DIAGNOSIS — R29.898 WEAKNESS OF LEFT LOWER EXTREMITY: Primary | ICD-10-CM

## 2017-02-15 PROCEDURE — 99999 PR PBB SHADOW E&M-EST. PATIENT-LVL III: CPT | Mod: PBBFAC,,, | Performed by: ORTHOPAEDIC SURGERY

## 2017-02-15 PROCEDURE — 99213 OFFICE O/P EST LOW 20 MIN: CPT | Mod: S$GLB,,, | Performed by: ORTHOPAEDIC SURGERY

## 2017-02-15 PROCEDURE — 73562 X-RAY EXAM OF KNEE 3: CPT | Mod: 26,LT,, | Performed by: RADIOLOGY

## 2017-02-15 PROCEDURE — 73560 X-RAY EXAM OF KNEE 1 OR 2: CPT | Mod: 26,59,RT, | Performed by: RADIOLOGY

## 2017-02-15 NOTE — MR AVS SNAPSHOT
[facility]  [address]  [facility] [address]  Phone: [phone]                  John Macedo   2/15/2017 3:00 PM   Office Visit    Description:  Male : 1960   Provider:  Alvarez Garcia MD   Department:  [facility]           Reason for Visit     Left Knee - Pain           Diagnoses this Visit        Comments    Weakness of left lower extremity    -  Primary     S/P revision of total knee, left         Chronic pain of left knee         Difficulty walking                To Do List           Future Appointments        Provider Department Dept Phone    2017 1:45 PM Naomi Nemwan, PTA [facility] [phone]    2017 2:30 PM Naomi Newman, PTA [facility] [phone]    2017 1:00 PM La Pickens, PT [facility] [phone]    3/1/2017 11:15 AM La Pickens, PT [facility] [phone]    3/3/2017 10:30 AM La Pickens, PT [facility] [phone]      Goals (5 Years of Data)     None      [facility]     [facility] -  Assistance  Registered nurses in the [facility] provide clinical advisement, health education, appointment booking, and other advisory services.  Call for this free service at [phone].             Medications           Message regarding Medications     Verify the changes and/or additions to your medication regime listed below are the same as discussed with your clinician today.  If any of these changes or additions are incorrect, please notify your healthcare provider.             Verify that the below list of medications is an accurate representation of the medications you are currently taking.  If none reported, the list may be blank. If incorrect, please contact your healthcare provider. Carry this list with you in case of emergency.           Current  "Medications     omeprazole (PRILOSEC OTC) 20 MG tablet Take 20 mg by mouth every morning.     oxycodone-acetaminophen (PERCOCET)  mg per tablet Take 1 tablet by mouth every 4 (four) hours as needed.           Clinical Reference Information           Your Vitals Were     BP Pulse Resp Height Weight BMI    138/90 (BP Location: Left arm, Patient Position: Sitting, BP Method: Automatic) 76 18 5' 8" (1.727 m) 108.8 kg (239 lb 15.5 oz) 36.49 kg/m2      Blood Pressure          Most Recent Value    BP  (!)  138/90      Allergies as of 2/15/2017     Shellfish Containing Products    Sulfa (Sulfonamide Antibiotics)      Immunizations Administered on Date of Encounter - 2/15/2017     None      Orders Placed During Today's Visit     Future Labs/Procedures Expected by Expires    EMG- 1 EXTREMITY  As directed 2/15/2018      Language Assistance Services     ATTENTION: Language assistance services are available, free of charge. Please call [phone].      ATENCIÓN: Si habla larisa, tiene a murillo disposición servicios gratuitos de asistencia lingüística. Llame al [phone].     ANNABELLA Ý: N?u b?n nói Ti?ng Vi?t, có các d?ch v? h? tr? ngôn ng? mi?n phí dành cho b?n. G?i s? [phone].         [facility] complies with applicable Federal civil rights laws and does not discriminate on the basis of race, color, national origin, age, disability, or sex.        "

## 2017-02-15 NOTE — PROGRESS NOTES
"Subjective:      Patient ID: John Macedo is a 57 y.o. male.    Chief Complaint: Pain of the Left Knee    HPI  John Macedo has left knee pain and persistent weakness.  The pain is unchanged. The pain is located in the thigh .  There is  radiation.  The pain radiates to the knee.  There is not associated stiffness.   There is not catching and locking. The pain is described as achy. The pain is aggravated by standing and walkng.  It is alleviated by rest.  There is associated back pain.  His history, medications and problem list were reviewed.    Review of Systems   Constitution: Negative for chills, fever and night sweats.   HENT: Negative for headaches and hearing loss.    Eyes: Negative for blurred vision and double vision.   Cardiovascular: Negative for chest pain, claudication and leg swelling.   Respiratory: Negative for shortness of breath.    Endocrine: Negative for polydipsia, polyphagia and polyuria.   Hematologic/Lymphatic: Negative for adenopathy and bleeding problem. Does not bruise/bleed easily.   Skin: Negative for poor wound healing.   Musculoskeletal: Positive for joint pain.   Gastrointestinal: Negative for diarrhea and heartburn.   Genitourinary: Negative for bladder incontinence.   Neurological: Negative for focal weakness, numbness, paresthesias and sensory change.   Psychiatric/Behavioral: The patient is not nervous/anxious.    Allergic/Immunologic: Negative for persistent infections.         Objective:      Body mass index is 36.49 kg/(m^2).  Vitals:    02/15/17 1439   BP: (!) 138/90   BP Location: Left arm   Patient Position: Sitting   BP Method: Automatic   Pulse: 76   Resp: 18   Weight: 108.8 kg (239 lb 15.5 oz)   Height: 5' 8" (1.727 m)           General    Constitutional: He is oriented to person, place, and time. He appears well-developed and well-nourished.   HENT:   Head: Normocephalic and atraumatic.   Eyes: EOM are normal.   Cardiovascular: Normal rate.    Pulmonary/Chest: " Effort normal.   Neurological: He is alert and oriented to person, place, and time.   Psychiatric: He has a normal mood and affect. His behavior is normal.     General Musculoskeletal Exam   Gait: normal       Right Knee Exam     Inspection   Erythema: absent  Scars: absent  Swelling: absent  Effusion: effusion  Deformity: deformity  Bruising: absent    Tenderness   The patient is experiencing no tenderness.         Range of Motion   Extension: 0   Flexion: 130     Tests   Ligament Examination Lachman: normal (-1 to 2mm)   MCL - Valgus: normal (0 to 2mm)  LCL - Varus: normal  Patella   Passive Patellar Tilt: neutral    Other   Sensation: normal    Left Knee Exam     Inspection   Erythema: absent  Scars: present  Swelling: absent  Effusion: absent  Deformity: deformity  Bruising: absent    Tenderness   The patient tender to palpation of the patella and patellar tendon.    Range of Motion   Extension: 0 (less than 10 degree lAG)   Flexion: 110     Tests   Stability Lachman: normal (-1 to 2mm)   MCL - Valgus: normal (0 to 2mm)  LCL - Varus: normal (0 to 2mm)  Patella   Passive Patellar Tilt: neutral    Other   Sensation: normal    Muscle Strength   Right Lower Extremity   Hip Abduction: 5/5   Quadriceps:  5/5   Hamstrin/5   Left Lower Extremity   Hip Abduction: 5/5   Quadriceps:  4/5   Hamstrin/5     Reflexes     Left Side  Quadriceps:  2+    Right Side   Quadriceps:  2+    Vascular Exam     Right Pulses  Dorsalis Pedis:      2+          Left Pulses  Dorsalis Pedis:      2+          Edema  Right Lower Leg: absent  Left Lower Leg: absent      Radiographs taken today were reviewed by me.  There is a prosthetic replacement of the left knee(s).  The prosthesis is well positioned.  There is not evidence of bone loss, osteolysis, or loosening. There is not a fracture.              Assessment:       Encounter Diagnoses   Name Primary?    Weakness of left lower extremity Yes    S/P revision of total knee, left      Chronic pain of left knee     Difficulty walking           Plan:       John was seen today for pain.    Diagnoses and all orders for this visit:    Weakness of left lower extremity  -     EMG- 1 EXTREMITY; Future    S/P revision of total knee, left  -     EMG- 1 EXTREMITY; Future    Chronic pain of left knee  -     EMG- 1 EXTREMITY; Future    Difficulty walking  -     EMG- 1 EXTREMITY; Future      He is still waiting for the brace.  It was approved Tuesday, but is on back order.  He should continue PT. His strength is improving.   It is time to repeat the EMG to re-assess nerve. (Should be Dr. Nam again)  No change in work status.    F/U after EMG

## 2017-02-17 ENCOUNTER — CLINICAL SUPPORT (OUTPATIENT)
Dept: REHABILITATION | Facility: HOSPITAL | Age: 57
End: 2017-02-17
Attending: ORTHOPAEDIC SURGERY
Payer: OTHER MISCELLANEOUS

## 2017-02-17 DIAGNOSIS — M25.662 DECREASED ROM OF LEFT KNEE: Chronic | ICD-10-CM

## 2017-02-17 DIAGNOSIS — Z78.9 IMPAIRED MOTOR CONTROL: Chronic | ICD-10-CM

## 2017-02-17 DIAGNOSIS — Z74.09 IMPAIRED FUNCTIONAL MOBILITY, BALANCE, GAIT, AND ENDURANCE: Chronic | ICD-10-CM

## 2017-02-17 DIAGNOSIS — R53.1 DECREASED STRENGTH: Primary | Chronic | ICD-10-CM

## 2017-02-17 PROCEDURE — 97110 THERAPEUTIC EXERCISES: CPT | Mod: PO

## 2017-02-17 PROCEDURE — 97116 GAIT TRAINING THERAPY: CPT | Mod: PO

## 2017-02-17 NOTE — PROGRESS NOTES
"                                                    Physical Therapy Progress Note     Name: John Macedo  Clinic Number: [id number]  Diagnosis:   Weakness of left lower extremity       S/P revision of total knee, left      Chronic pain of left knee      Difficulty walking        Physician: Alvarez Garcia MD  Treatment Orders: PT Eval and Treat, Bioness trial/ assessment  Past Medical History   Diagnosis Date    Arthritis     GERD (gastroesophageal reflux disease)        Precautions: standard  Visit #: 5  Date of Eval: 1/27/2017  Plan of Care Expiration: 4/21/2017        Subjective   Pt reports: " I get my brace today in the mail."  Pain Scale: Soreness with  LBP and 4/10 left knee pain     Objective     Patient received individual therapy to increase strength, endurance, ROM, flexibility and ambualtion with activities as follows:     Pt participated in gait training x 30 minutes to improve gait technique and consistency:  Bioness L300 Plus training  Multiple laps up/down hallway, Ascending/Descending 2 flights of steps, on turf,    Total steps= 734 steps    Pt participated in therapeutic exercises x 23 minutes to improve motor control and strength to improve gait:   Recumbent bike BUE/LE L7 x 6 minutes  Bioness training mode ( 5 sec on/5 sec off)  Left SLR 3# 3 x 10 reps  L SAQ 3#  3 x 10 reps      Written Home Exercises: 2/9/17: issued wall slides to improve knee flexion, prone HS curls, and prone hip hip extension    Education provided re: POC, HEP    Pt has no cultural, educational or language barriers to learning provided.    Assessment   John tolerated treatment well and did not have any complaints.  Pt with increased gait distance and was able to ambulate with Bioness donned and no crutch.  Pt showing fatigue after long gait session but still able to perform exercises at the end with Bioness donned.  Cont with POC.      Pt prognosis is Good. Pt will continue to benefit from skilled outpatient " physical therapy to address the deficits listed in the problem list, provide pt/family education and to maximize pt's level of independence in the home and community environment.     Anticipated barriers to physical therapy: PMHx, history of previous PT, anxiety about returning to PLOF    Medical necessity is demonstrated by the following IMPAIRMENTS/PROBLEM LIST:   Fall Risk - impaired balance   Weakness   Decreased ROM  Gait deviations   Decreased ambulation   Decreased activity tolerance   Difficulty participating in daily activities  Continued inability to participate fully in vocational pursuits   Requires skilled supervision to complete and progress HEP      Pt's spiritual, cultural and educational needs considered and pt agreeable to plan of care and GOALS as stated below:   Short term goals: 4-6 weeks, pt agrees to goals set.  1. Pt will perform self ROM for knee flexion to improve mobility to correct gait impairments.  2. Pt will demonstrate improved left knee flexion AAROM in sitting to 95 degrees to improve LE mobility for mobility (i.e. Sit<>stand, stairs).  3. Pt will perform TUG with Bioness L300 Plus and [employer] crutch in 10 sec to demonstrate improved mobility and decreased fall risk.   4. Pt will demonstrate improved bilateral hip extension strength to 4/5 to improve balance and ambulation.  5. Pt will report decreased left knee pain average to 5/10 with weight bearing to improve tolerance to mobility.  6. Assess ability to negotiate curbs and ramps and set goals as needed.      Long term goals: 8-12 weeks, pt agrees to goals set  7. 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  8. Pt will demonstrate improved ambulation velocity to 1.24 m/s to demonstrate a 25% improvement in speed/ mobility.   9. Pt will ambulate at least 300 ft with Bioness L300 Plus with or without Lofstrand to  demonstrate improved ability to negotiate community distances.  10. Pt will demonstrate improved left knee strength to 4+/5 to improve all mobility, decrease risk of knee buckling, and decrease need for AD for ambulation.  11. Pt will report decreased left knee pain average to 3/10 or less to demonstrate improved tolerance to all mobility     Plan   Continue PT 2-3x weekly under established Plan of Care, with treatment to include: pt education, HEP, therapeutic exercises, neuromuscular re-education/balance exercises, therapeutic activities, NMES, joint mobilizations PRN, manual therapy PRN, and modalities PRN, to work towards established goals. Pt may be seen by PTA to carry out plan of care.     Naomi Newman, PTA   02/17/2017

## 2017-02-21 ENCOUNTER — CLINICAL SUPPORT (OUTPATIENT)
Dept: REHABILITATION | Facility: HOSPITAL | Age: 57
End: 2017-02-21
Attending: ORTHOPAEDIC SURGERY
Payer: OTHER MISCELLANEOUS

## 2017-02-21 DIAGNOSIS — M25.662 DECREASED ROM OF LEFT KNEE: Chronic | ICD-10-CM

## 2017-02-21 DIAGNOSIS — R53.1 DECREASED STRENGTH: Primary | Chronic | ICD-10-CM

## 2017-02-21 DIAGNOSIS — Z78.9 IMPAIRED MOTOR CONTROL: Chronic | ICD-10-CM

## 2017-02-21 DIAGNOSIS — Z74.09 IMPAIRED FUNCTIONAL MOBILITY, BALANCE, GAIT, AND ENDURANCE: Chronic | ICD-10-CM

## 2017-02-21 PROCEDURE — 97116 GAIT TRAINING THERAPY: CPT | Mod: PO

## 2017-02-21 NOTE — PROGRESS NOTES
"                                                    Physical Therapy Progress Note     Name: John Macedo  Clinic Number: [id number]  Diagnosis:   Weakness of left lower extremity       S/P revision of total knee, left      Chronic pain of left knee      Difficulty walking        Physician: Alvarez Garcia MD  Treatment Orders: PT Eval and Treat, Bioness trial/ assessment  Past Medical History   Diagnosis Date    Arthritis     GERD (gastroesophageal reflux disease)        Precautions: standard  Visit #: 6  Date of Eval: 1/27/2017  Plan of Care Expiration: 4/21/2017        Subjective   Pt reports: " I got my brace at 5pm on Friday and tried to use it this weekend but couldn't figure out how to turn it on."  Pain Scale: some LBP noted.      Objective   Pt brought in his own L300 today.  Patient received individual therapy to increase strength, endurance, ROM, flexibility and ambualtion with activities as follows:     Pt participated in gait training x 45 minutes to improve gait technique and consistency:  Bioness L300 Plus training  Multiple laps up/down hallway, Ascending/Descending 2 flights of steps, on turf, outside.  Pt with one sitting rest break required.        Pt participated in therapeutic exercises x 0 minutes to improve motor control and strength to improve gait:       Written Home Exercises: 2/9/17: issued wall slides to improve knee flexion, prone HS curls, and prone hip hip extension    Education provided re: Educated pt on use of self L300 and how to use the remote control.  Proper way to don on/off    Pt has no cultural, educational or language barriers to learning provided.    Assessment   John tolerated treatment well and did not have any complaints.  Educated pt on wearing schedule and how to don/off L300.  Pt educated on how to use the personal remote control and how to increase/decrease the intensity.  Pt was able to demonstrate understanding.     Cont with POC.      Pt prognosis is " Good. Pt will continue to benefit from skilled outpatient physical therapy to address the deficits listed in the problem list, provide pt/family education and to maximize pt's level of independence in the home and community environment.     Anticipated barriers to physical therapy: PMHx, history of previous PT, anxiety about returning to PLOF    Medical necessity is demonstrated by the following IMPAIRMENTS/PROBLEM LIST:   Fall Risk - impaired balance   Weakness   Decreased ROM  Gait deviations   Decreased ambulation   Decreased activity tolerance   Difficulty participating in daily activities  Continued inability to participate fully in vocational pursuits   Requires skilled supervision to complete and progress HEP      Pt's spiritual, cultural and educational needs considered and pt agreeable to plan of care and GOALS as stated below:   Short term goals: 4-6 weeks, pt agrees to goals set.  1. Pt will perform self ROM for knee flexion to improve mobility to correct gait impairments.  2. Pt will demonstrate improved left knee flexion AAROM in sitting to 95 degrees to improve LE mobility for mobility (i.e. Sit<>stand, stairs).  3. Pt will perform TUG with [employer] L300 Plus and [employer] crutch in 10 sec to demonstrate improved mobility and decreased fall risk.   4. Pt will demonstrate improved bilateral hip extension strength to 4/5 to improve balance and ambulation.  5. Pt will report decreased left knee pain average to 5/10 with weight bearing to improve tolerance to mobility.  6. Assess ability to negotiate curbs and ramps and set goals as needed.      Long term goals: 8-12 weeks, pt agrees to goals set  7. 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  8. Pt will demonstrate improved ambulation velocity to 1.24 m/s to demonstrate a 25% improvement in speed/ mobility.   9. Pt will ambulate at least 300 ft  with Bioness L300 Plus with or without Lofstrand to demonstrate improved ability to negotiate community distances.  10. Pt will demonstrate improved left knee strength to 4+/5 to improve all mobility, decrease risk of knee buckling, and decrease need for AD for ambulation.  11. Pt will report decreased left knee pain average to 3/10 or less to demonstrate improved tolerance to all mobility     Plan   Continue PT 2-3x weekly under established Plan of Care, with treatment to include: pt education, HEP, therapeutic exercises, neuromuscular re-education/balance exercises, therapeutic activities, NMES, joint mobilizations PRN, manual therapy PRN, and modalities PRN, to work towards established goals. Pt may be seen by PTA to carry out plan of care.     Naomi Newman, PTA   02/21/2017

## 2017-02-22 ENCOUNTER — TELEPHONE (OUTPATIENT)
Dept: ORTHOPEDICS | Facility: CLINIC | Age: 57
End: 2017-02-22

## 2017-02-22 NOTE — TELEPHONE ENCOUNTER
Spoke to Katherin and she states she will call the  to see if she can get it approved and to see if it can be done here or outpt.

## 2017-02-22 NOTE — TELEPHONE ENCOUNTER
----- Message from Umm Butcher sent at 2/22/2017  8:52 AM CST -----  Regarding: RE: Peer to peer   Contact: [phone]  Also, could you create the referral so that I can document, please?  Thanks.  ----- Message -----     From: Yarely Serrano MA     Sent: 2/22/2017   8:46 AM       To: Umm Butcher  Subject: RE: Peer to peer                                 Good morning, I sent this message to . Did you get an approval for his EMG. Toyin states she call Sydney and she states if the order is in you would work on it.   ----- Message -----     From: Umm Butcher     Sent: 2/22/2017   8:28 AM       To: [facility] Staff  Subject: Peer to peer                                     Good morning,    The mentioned patient request for cont'd physical therapy has been escalated to [id number] review. There is a form that is scanned into the patient's chart that must be filled out prior to the phone conference on 2.23.2017. The NCM is Nicolás Jonas p-[phone] ext [phone]. For any additional information please contact me at [phone].    Thanks,    Katherin Butcher

## 2017-02-24 ENCOUNTER — CLINICAL SUPPORT (OUTPATIENT)
Dept: REHABILITATION | Facility: HOSPITAL | Age: 57
End: 2017-02-24
Attending: ORTHOPAEDIC SURGERY
Payer: OTHER MISCELLANEOUS

## 2017-02-24 DIAGNOSIS — Z78.9 IMPAIRED MOTOR CONTROL: ICD-10-CM

## 2017-02-24 DIAGNOSIS — R53.1 DECREASED STRENGTH: ICD-10-CM

## 2017-02-24 DIAGNOSIS — M25.662 DECREASED ROM OF LEFT KNEE: ICD-10-CM

## 2017-02-24 DIAGNOSIS — Z74.09 IMPAIRED FUNCTIONAL MOBILITY, BALANCE, GAIT, AND ENDURANCE: ICD-10-CM

## 2017-02-24 PROCEDURE — 97116 GAIT TRAINING THERAPY: CPT | Mod: PO | Performed by: PHYSICAL THERAPIST

## 2017-02-24 PROCEDURE — 97110 THERAPEUTIC EXERCISES: CPT | Mod: PO | Performed by: PHYSICAL THERAPIST

## 2017-02-24 NOTE — PROGRESS NOTES
"                                                    Physical Therapy Progress Note     Name: John Macedo  Clinic Number: [id number]  Diagnosis:   Weakness of left lower extremity       S/P revision of total knee, left      Chronic pain of left knee      Difficulty walking        Physician: Alvarez Garcia MD  Treatment Orders: PT Eval and Treat, Bioness trial/ assessment  Past Medical History:   Diagnosis Date    Arthritis     GERD (gastroesophageal reflux disease)        Precautions: standard  Visit #: 7  Date of Eval: 2017  Plan of Care Expiration: 2017        Subjective   Pt reports: no new complaints. Using [employer] L300 Plus- 4 hr use yesterday. [facility]    Pain Scale: some LBP noted, not rated. Left knee pain in standing 5/10     Objective   Pt brought in his own L300 today.  Patient received individual therapy to increase strength, endurance, ROM, flexibility and ambualtion with activities as follows:     Pt participated in gait training x 33 minutes to improve gait technique and consistency:  Bioness L300 Plus trainin ft without AD without resting x 3  645 total steps    negotiated 6" curb without support, VC for technique  Negotiated stairs, reciprocal pattern, slight UE touch- mod I  TM at 1.7 mph x 10 min BUE support  TUG with L300 Plus= 10.97 sec  SSWS with L300 plus= 6m in 6.33 sec= 0.95 m/s  FSWS with L300 plus= 6m in 5.17 sec= 1.16 m/s    Pt participated in therapeutic exercises x 12 minutes to improve motor control and strength to improve gait:   Left knee flex AAROM in sitting- 105 degrees   SLR left 3# training mode x 5 min  SAQ 3# training mode x 5 min    Written Home Exercises: 17: issued wall slides to improve knee flexion, prone HS curls, and prone hip hip extension    Education provided re: Educated pt on use of self L300 and how to use the remote control.  Proper way to don on/off    Pt has no cultural, educational or language " barriers to learning provided.    Assessment   Assessment period: 1/27/17 to 2/24/17. John tolerates treatments well. Pt reported adjusting remote for stimulation from L300 Plus. PT reprogrammed controller to settings pt was using at home. PT also adjusted thigh settings for training mode for improved quad contraction. Pt was educated in noticing fatigue in LE with L300 Plus and resting to prevent falling. Pt reports he wore the device for 4 hrs yesterday at his job. Pt is now beginning to ambualte without AD since obtaining personal L300 Plus unit. Pt demonstrates a good improvement in left knee stability with use of L300 Plus. Pt reports tolerance of at least 4 hrs- no consistently ambulating. Pt also demonstrated ability to negotiate curbs and ramps without AD with use of L300 Plus. Pt was able to tolerate ambulation on treadmill at 1.7 mph with BUE support for 10 minutes. Pt reports good compliance for ROM of left knee flexion. ROM has improved, but is not sufficient for stair climbing or adequate sit<>stand. Pt also reports decreased average left knee pain with use of L300 Plus. Pt can benefit from continued skilled PT for continued training with L300 Plus for pt education in training sessions and improving consistency with gait.    Pt prognosis is Good. Pt will continue to benefit from skilled outpatient physical therapy to address the deficits listed in the problem list, provide pt/family education and to maximize pt's level of independence in the home and community environment.     Anticipated barriers to physical therapy: PMHx, history of previous PT, anxiety about returning to PLOF    Medical necessity is demonstrated by the following IMPAIRMENTS/PROBLEM LIST:   Fall Risk - impaired balance   Weakness   Decreased ROM  Gait deviations   Decreased ambulation   Decreased activity tolerance   Difficulty participating in daily activities  Continued inability to participate fully in vocational pursuits    Requires skilled supervision to complete and progress HEP      Pt's spiritual, cultural and educational needs considered and pt agreeable to plan of care and GOALS as stated below:   Status as of 2/24/17:   Short term goals: 4-6 weeks, pt agrees to goals set.  1. Pt will perform self ROM for knee flexion to improve mobility to correct gait impairments. Met- pt reports good complaince  2. Pt will demonstrate improved left knee flexion AAROM in sitting to 95 degrees to improve LE mobility for mobility (i.e. Sit<>stand, stairs). Met- 105 degrees in sitting.   3. Pt will perform TUG with Bioness L300 Plus and [employer] crutch in 10 sec to demonstrate improved mobility and decreased fall risk. Improved- 10.97 sec with L300 Plus, no AD  4. Pt will demonstrate improved bilateral hip extension strength to 4/5 to improve balance and ambulation. NT  5. Pt will report decreased left knee pain average to 5/10 with weight bearing to improve tolerance to mobility. Met- 5/10 average left knee pain   6. Assess ability to negotiate curbs and ramps and set goals as needed. Met- supervision-mod I with Bioness L300 Plus without AD     Long term goals: 8-12 weeks, pt agrees to goals set  7. 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 Pt will demonstrate improved ambulation velocity to 1.24 m/s to demonstrate a 25% improvement in speed/ mobility. Improved- 0.95 m/s without AD, using L300 plus  9. Pt will ambulate at least 300 ft with Bioness L300 Plus with or without Lofstrand to demonstrate improved ability to negotiate community distances. Met- able to ambulate at least 300 ft with Bioness L300 Plus without   10. Pt will demonstrate improved left knee strength to 4+/5 to improve all mobility, decrease risk of knee buckling, and decrease need for AD for ambulation. NT  11. Pt will report decreased left knee pain  average to 3/10 or less to demonstrate improved tolerance to all mobility see STG 5     Plan   Continue PT 2-3x weekly under established Plan of Care, with treatment to include: pt education, HEP, therapeutic exercises, neuromuscular re-education/balance exercises, therapeutic activities, NMES, joint mobilizations PRN, manual therapy PRN, and modalities PRN, to work towards established goals. Pt may be seen by PTA to carry out plan of care.     La Pickens, PT   02/24/2017

## 2017-03-01 ENCOUNTER — CLINICAL SUPPORT (OUTPATIENT)
Dept: REHABILITATION | Facility: HOSPITAL | Age: 57
End: 2017-03-01
Attending: ORTHOPAEDIC SURGERY
Payer: OTHER MISCELLANEOUS

## 2017-03-01 DIAGNOSIS — M25.662 DECREASED ROM OF LEFT KNEE: ICD-10-CM

## 2017-03-01 DIAGNOSIS — R53.1 DECREASED STRENGTH: ICD-10-CM

## 2017-03-01 DIAGNOSIS — Z78.9 IMPAIRED MOTOR CONTROL: ICD-10-CM

## 2017-03-01 DIAGNOSIS — Z74.09 IMPAIRED FUNCTIONAL MOBILITY, BALANCE, GAIT, AND ENDURANCE: ICD-10-CM

## 2017-03-01 PROCEDURE — 97116 GAIT TRAINING THERAPY: CPT | Mod: PO | Performed by: PHYSICAL THERAPIST

## 2017-03-01 PROCEDURE — 97110 THERAPEUTIC EXERCISES: CPT | Mod: PO | Performed by: PHYSICAL THERAPIST

## 2017-03-01 PROCEDURE — 97140 MANUAL THERAPY 1/> REGIONS: CPT | Mod: PO | Performed by: PHYSICAL THERAPIST

## 2017-03-01 NOTE — PLAN OF CARE
"                                                    Physical Therapy Progress Note     Name: John Macedo  Clinic Number: [id number]  Diagnosis:   Weakness of left lower extremity       S/P revision of total knee, left      Chronic pain of left knee      Difficulty walking        Physician: Alvarez Garcia MD  Treatment Orders: PT Eval and Treat, Bioness trial/ assessment  Past Medical History:   Diagnosis Date    Arthritis     GERD (gastroesophageal reflux disease)        Precautions: standard  Visit #: 7  Date of Eval: 2017  Plan of Care Expiration: 2017        Subjective   Pt reports: no new complaints. Using [employer] L300 Plus- 4 hr use yesterday. [facility]    Pain Scale: some LBP noted, not rated. Left knee pain in standing 5/10     Objective   Pt brought in his own L300 today.  Patient received individual therapy to increase strength, endurance, ROM, flexibility and ambualtion with activities as follows:     Pt participated in gait training x 33 minutes to improve gait technique and consistency:  Bioness L300 Plus trainin ft without AD without resting x 3  645 total steps    negotiated 6" curb without support, VC for technique  Negotiated stairs, reciprocal pattern, slight UE touch- mod I  TM at 1.7 mph x 10 min BUE support  TUG with L300 Plus= 10.97 sec  SSWS with L300 plus= 6m in 6.33 sec= 0.95 m/s  FSWS with L300 plus= 6m in 5.17 sec= 1.16 m/s    Pt participated in therapeutic exercises x 12 minutes to improve motor control and strength to improve gait:   Left knee flex AAROM in sitting- 105 degrees   SLR left 3# training mode x 5 min  SAQ 3# training mode x 5 min    Written Home Exercises: 17: issued wall slides to improve knee flexion, prone HS curls, and prone hip hip extension    Education provided re: Educated pt on use of self L300 and how to use the remote control.  Proper way to don on/off    Pt has no cultural, educational or language " barriers to learning provided.    Assessment   Assessment period: 1/27/17 to 2/24/17. John tolerates treatments well. Pt reported adjusting remote for stimulation from L300 Plus. PT reprogrammed controller to settings pt was using at home. PT also adjusted thigh settings for training mode for improved quad contraction. Pt was educated in noticing fatigue in LE with L300 Plus and resting to prevent falling. Pt reports he wore the device for 4 hrs yesterday at his job. Pt is now beginning to ambualte without AD since obtaining personal L300 Plus unit. Pt demonstrates a good improvement in left knee stability with use of L300 Plus. Pt reports tolerance of at least 4 hrs- no consistently ambulating. Pt also demonstrated ability to negotiate curbs and ramps without AD with use of L300 Plus. Pt was able to tolerate ambulation on treadmill at 1.7 mph with BUE support for 10 minutes. Pt reports good compliance for ROM of left knee flexion. ROM has improved, but is not sufficient for stair climbing or adequate sit<>stand. Pt also reports decreased average left knee pain with use of L300 Plus. Pt can benefit from continued skilled PT for continued training with L300 Plus for pt education in training sessions and improving consistency with gait.    Pt prognosis is Good. Pt will continue to benefit from skilled outpatient physical therapy to address the deficits listed in the problem list, provide pt/family education and to maximize pt's level of independence in the home and community environment.     Anticipated barriers to physical therapy: PMHx, history of previous PT, anxiety about returning to PLOF    Medical necessity is demonstrated by the following IMPAIRMENTS/PROBLEM LIST:   Fall Risk - impaired balance   Weakness   Decreased ROM  Gait deviations   Decreased ambulation   Decreased activity tolerance   Difficulty participating in daily activities  Continued inability to participate fully in vocational pursuits    Requires skilled supervision to complete and progress HEP      Pt's spiritual, cultural and educational needs considered and pt agreeable to plan of care and GOALS as stated below:   Status as of 2/24/17:   Short term goals: 4-6 weeks, pt agrees to goals set.  1. Pt will perform self ROM for knee flexion to improve mobility to correct gait impairments. Met- pt reports good complaince  2. Pt will demonstrate improved left knee flexion AAROM in sitting to 95 degrees to improve LE mobility for mobility (i.e. Sit<>stand, stairs). Met- 105 degrees in sitting.   3. Pt will perform TUG with Bioness L300 Plus and [employer] crutch in 10 sec to demonstrate improved mobility and decreased fall risk. Improved- 10.97 sec with L300 Plus, no AD  4. Pt will demonstrate improved bilateral hip extension strength to 4/5 to improve balance and ambulation. NT  5. Pt will report decreased left knee pain average to 5/10 with weight bearing to improve tolerance to mobility. Met- 5/10 average left knee pain   6. Assess ability to negotiate curbs and ramps and set goals as needed. Met- supervision-mod I with Bioness L300 Plus without AD     Long term goals: 8-12 weeks, pt agrees to goals set  7. 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 Pt will demonstrate improved ambulation velocity to 1.24 m/s to demonstrate a 25% improvement in speed/ mobility. Improved- 0.95 m/s without AD, using L300 plus  9. Pt will ambulate at least 300 ft with Bioness L300 Plus with or without [place]strand to demonstrate improved ability to negotiate community distances. Met- able to ambulate at least 300 ft with Bioness L300 Plus without   10. Pt will demonstrate improved left knee strength to 4+/5 to improve all mobility, decrease risk of knee buckling, and decrease need for AD for ambulation. NT  11. Pt will report decreased left knee pain  average to 3/10 or less to demonstrate improved tolerance to all mobility see STG 5     Plan   Continue PT 2-3x weekly under established Plan of Care, with treatment to include: pt education, HEP, therapeutic exercises, neuromuscular re-education/balance exercises, therapeutic activities, NMES, joint mobilizations PRN, manual therapy PRN, and modalities PRN, to work towards established goals. Pt may be seen by PTA to carry out plan of care.     La Pickens, PT   02/24/2017      I certify the need for these services furnished under this plan of treatment and while under my care.  ____________________________________ Physician/Referring Practitioner   Date of Signature

## 2017-03-01 NOTE — PROGRESS NOTES
Physical Therapy Progress Note     Name: John Macedo  Clinic Number: [id number]  Diagnosis:   Weakness of left lower extremity       S/P revision of total knee, left      Chronic pain of left knee      Difficulty walking        Physician: Alvarez Garcia MD  Treatment Orders: PT Eval and Treat, Bioness trial/ assessment  Past Medical History:   Diagnosis Date    Arthritis     GERD (gastroesophageal reflux disease)        Precautions: standard  Visit #: 8  Date of Eval: 1/27/2017  Plan of Care Expiration: 4/21/2017        Subjective   Pt reports: I did a lot over the weekend, my knee hurt and was swollen    Pain Scale: some LBP noted, 7-8/10 with occurances. Left knee pain average 4/10     Objective   Pt brought in his own L300 today.  Patient received individual therapy to increase strength, endurance, ROM, flexibility and ambualtion with activities as follows:     Pt participated in gait training x 25 minutes to improve gait technique and consistency:  TM at 2.3 mph x 10 minutes, BUE support    Functional Gait Assessment:   1. Gait on level surface =  2   (3) Normal: less than 5.5 sec, no A.D., no imbalance, normal gait pattern, deviates< 6in   (2) Mild impairment: 7-5.6 sec, uses A.D., mild gait deviations, or deviates 6-10 in   (1) Moderate impairment: > 7 sec, slow speed, imbalance, deviates 10-15 in.   (0) Severe impairment: needs assist, deviates >15 in, reach/touch wall  2. Change in Gait Speed = 2   (3) Normal: smooth change w/o loss of balance or gait deviation, deviates < 6 in, significant difference between speeds   (2) Mild impairment: changes speed, but demonstrates mild gait deviations, deviates 6-10 in, OR no deviations but unable to significantly speed, OR uses A.D.   (1) Moderate impairment: minor changes to speed, OR changes speed w/ significant deviations, deviates 10-15 in, OR  Changes speed , but loses balance & recovers   (0)  Severe impairment: cannot change speed, deviates >15 in, or loses balance & needs assist  3. Gait with horizontal head turns  = 2   (3) Normal: no change in gait, deviates <6 in   (2) Mild impairment: slight change in speed, deviates 6-10 in, OR uses A.D.   (1) Moderate impairment: moderate change in speed, deviates 10-15 in   (0) Severe impairment: severe disruption of gait, deviates >15in  4. Gait with vertical head turns = 3   (3) Normal: no change in gait, deviates <6 in   (2) Mild impairment: slight change in speed, deviates 6-10 in OR uses A.D.   (1) Moderate impairment: moderate change in speed, deviates 10-15 in   (0) Severe impairment: severe disruption of gait, deviates >15 in  5. Gait with pivot turns = 3   (3) Normal: performs safely in 3 sec, no LOB   (2) Mild impairment: performs in >3 sec & no LOB, OR turns safely & requires several steps to regain LOB   (1) Moderate impairment: turns slow, OR requires several small steps for balance following turn & stop   (0) Severe impairment: cannot turn safely, needs assist  6. Step over obstacle = 2   (3) Normal: steps over 2 stacked boxes w/o change in speed or LOB   (2) Mild impairment: able to step over 1 box w/o change in speed or LOB   (1) Moderate impairment: steps over 1 box but must slow down, may require VC   (0) Severe impairment: cannot perform w/o assist  7. Gait with Narrow BERTHA = 2   (3) Normal: 10 steps no staggering   (2) Mild impairment: 7-9 steps   (1) Moderate impairment: 4-7 steps   (0) Severe impairment: < 4 steps or cannot perform w/o assist  8. Gait with eyes closed = 0, deviates   (3) Normal: < 7 sec, no A.D., no LOB, normal gait pattern, deviates <6 in   (2) Mild impairment: 7.1-9 sec, mild gait deviations, deviates 6-10 in   (1) Moderate impairment: > 9 sec, abnormal pattern, LOB, deviates 10-15 in   (0) Severe impairment: cannot perform w/o assist, LOB, deviates >15in  9. Ambulating Backwards = 2   (3) Normal: no A.D., no LOB, normal  gait pattern, deviates <6in   (2) Mild impairment: uses A.D., slower speed, mild gait deviations, deviates 6-10 in   (1) Moderate impairment: slow speed, abnormal gait pattern, LOB, deviates 10-15 in   (0) Severe impairment: severe gait deviations or LOB, deviates >15in  10. Steps = 2   (3) Normal: alternating feet, no rail   (2) Mild Impairment: alternating feet, uses rail   (1) Moderate impairment: step-to, uses rail   (0) Severe impairment: cannot perform safely  Score 20/30     Negotiated stairs, reciprocal pattern, 1-2 HR use- mod I    Pt participated in therapeutic exercises x 10 minutes to improve motor control and strength to improve gait:   AAROM left knee flexion in sitting with heel tap= 85 degrees, increased pitting edema 2+  Prone left HS curls 2# 3 x 10  AAROM left knee flex in prone- contract- relax.    Pt participated in manual therapy x 15 minutes to address left knee pain and ROM:  Graston Technique (GT) in sitting: GT 2 sweeping/ fanning over quads and around knee. GT 4 sweeping/ fanning quads, rocking at IT band (poor mobility noted), GT 3 filet of  IT band/ TFL, framing of knee, strumming of ligaments  GT in supine with knee ext: skin rolling over scar GT 3, filet of IT band    Written Home Exercises: 2/9/17: issued wall slides to improve knee flexion, prone HS curls, and prone hip hip extension    Education provided re: Educated pt on use of self L300 and how to use the remote control.  Proper way to don on/off    Pt has no cultural, educational or language barriers to learning provided.    Assessment   John tolerated treatment well. Pt reports increased use of L300 Plus and denies falls or need for AD. Pt reports continued left knee pain, but this has overall improved. Pt also reports intermittent LBP radiating into RLE. LBP is likely due to Trendelenburg gait pattern. PT will address LBP if complaints become more frequent. Pt was able to tolerate increased speed on treadmill. Left knee  flexion ROM was decreased from last visit, but increased edema was noted. Pt was not noted to have pitting edema on last visit. Increased edema is likely due to pt participating in increased activities this weekend. Tool assisted manual therapy was performed in attempts to improve knee ROM. Pt was noted to have decreased mobility between anterior aspect of IT band and quad and distal portion of TKA scar. Pt was instructed to resume scar massage and continue with PROM to improve knee ROM. Overall, pt demonstrates good progress towards goals with use of L300 Plus. PT increased gait setting due to pt reporting continued need to increase stimulation initially with application of device. Pt can benefit from continued skilled PT to address remaining impairments to improve safe mobility to return to PLOF.       Pt prognosis is Good. Pt will continue to benefit from skilled outpatient physical therapy to address the deficits listed in the problem list, provide pt/family education and to maximize pt's level of independence in the home and community environment.     Anticipated barriers to physical therapy: PMHx, history of previous PT, anxiety about returning to PLOF    Medical necessity is demonstrated by the following IMPAIRMENTS/PROBLEM LIST:   Fall Risk - impaired balance   Weakness   Decreased ROM  Gait deviations   Decreased ambulation   Decreased activity tolerance   Difficulty participating in daily activities  Continued inability to participate fully in vocational pursuits   Requires skilled supervision to complete and progress HEP      Pt's spiritual, cultural and educational needs considered and pt agreeable to plan of care and GOALS as stated below:   Status as of 2/24/17:   Short term goals: 4-6 weeks, pt agrees to goals set.  1. Pt will perform self ROM for knee flexion to improve mobility to correct gait impairments. Met- pt reports good complaince  2. Pt will demonstrate improved left knee flexion AAROM in  sitting to 95 degrees to improve LE mobility for mobility (i.e. Sit<>stand, stairs). Met- 105 degrees in sitting.   3. Pt will perform TUG with Bioness L300 Plus and Lofstrand crutch in 10 sec to demonstrate improved mobility and decreased fall risk. Improved- 10.97 sec with L300 Plus, no AD  4. Pt will demonstrate improved bilateral hip extension strength to 4/5 to improve balance and ambulation. NT  5. Pt will report decreased left knee pain average to 5/10 with weight bearing to improve tolerance to mobility. Met- 5/10 average left knee pain   6. Assess ability to negotiate curbs and ramps and set goals as needed. Met- supervision-mod I with Bioness L300 Plus without AD      Long term goals: 8-12 weeks, pt agrees to goals set  7. 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 Pt will demonstrate improved ambulation velocity to 1.24 m/s to demonstrate a 25% improvement in speed/ mobility. Improved- 0.95 m/s without AD, using L300 plus  9. Pt will ambulate at least 300 ft with Bioness L300 Plus with or without Lofstrand to demonstrate improved ability to negotiate community distances. Met- able to ambulate at least 300 ft with Bioness L300 Plus without   10. Pt will demonstrate improved left knee strength to 4+/5 to improve all mobility, decrease risk of knee buckling, and decrease need for AD for ambulation. NT  11. Pt will report decreased left knee pain average to 3/10 or less to demonstrate improved tolerance to all mobility see STG 5      Plan   Continue PT 2-3x weekly under established Plan of Care, with treatment to include: pt education, HEP, therapeutic exercises, neuromuscular re-education/balance exercises, therapeutic activities, NMES, joint mobilizations PRN, manual therapy PRN, and modalities PRN, to work towards established goals. Pt may be seen by PTA to carry out plan of care.      La Pickens, PT   03/01/2017

## 2017-03-03 ENCOUNTER — CLINICAL SUPPORT (OUTPATIENT)
Dept: REHABILITATION | Facility: HOSPITAL | Age: 57
End: 2017-03-03
Attending: ORTHOPAEDIC SURGERY
Payer: OTHER MISCELLANEOUS

## 2017-03-03 DIAGNOSIS — M25.662 DECREASED ROM OF LEFT KNEE: ICD-10-CM

## 2017-03-03 DIAGNOSIS — R53.1 DECREASED STRENGTH: ICD-10-CM

## 2017-03-03 DIAGNOSIS — Z74.09 IMPAIRED FUNCTIONAL MOBILITY, BALANCE, GAIT, AND ENDURANCE: ICD-10-CM

## 2017-03-03 DIAGNOSIS — Z78.9 IMPAIRED MOTOR CONTROL: ICD-10-CM

## 2017-03-03 PROCEDURE — 97116 GAIT TRAINING THERAPY: CPT | Mod: PO | Performed by: PHYSICAL THERAPIST

## 2017-03-03 PROCEDURE — 97110 THERAPEUTIC EXERCISES: CPT | Mod: PO | Performed by: PHYSICAL THERAPIST

## 2017-03-03 PROCEDURE — 97140 MANUAL THERAPY 1/> REGIONS: CPT | Mod: PO | Performed by: PHYSICAL THERAPIST

## 2017-03-03 NOTE — PROGRESS NOTES
Physical Therapy Progress Note     Name: John Macedo  Clinic Number: [id number]  Diagnosis:   Weakness of left lower extremity       S/P revision of total knee, left      Chronic pain of left knee      Difficulty walking        Physician: Alvarez Garcia MD  Treatment Orders: PT Eval and Treat, Bioness trial/ assessment  Past Medical History:   Diagnosis Date    Arthritis     GERD (gastroesophageal reflux disease)        Precautions: standard  Visit #: 9  Date of Eval: 2017  Plan of Care Expiration: 2017        Subjective   Pt reports: no new complaints    Pain Scale: some LBP noted, 6-7/10. Left knee pain 4/10    Objective   Pt brought in his own L300 today.  Patient received individual therapy to increase strength, endurance, ROM, flexibility and ambualtion with activities as follows:     Pt participated in gait training x 10 minutes to improve gait technique and consistency:  TM at 2.3 mph x 10 minutes, BUE support      Pt participated in therapeutic exercises x 20 minutes to improve motor control and strength to improve gait:    Recumbent cycle x 5 minutes to improve left knee flexion  AROM left knee flex in sittin degrees   AAROM left knee flexion in sitting with heel tap after manual therapy= 93 degrees, pitting edema  1+  Prone left HS curls 2# 3 x 10  SLR with L300 Plus training mode 3# x 5 minutes      Pt participated in manual therapy x 15 minutes to address left knee pain and ROM:  Graston Technique (GT) in sitting: GT 2 sweeping/ fanning over quads and around knee. GT 4  sweeping/ fanning quads, rocking at IT band (poor mobility noted), framing of knee,  strumming of ligaments  GT in sitting with knee ext: skin rolling over scar GT 3, nudging at joint line, tenderness reported  medially    Written Home Exercises: 3/3/17- reviewed scar massage and self massage to decrease edema  17: issued wall slides to improve knee  flexion, prone HS curls, and prone hip hip extension    Education provided re: Educated pt on use of self L300 and how to use the remote control.  Proper way to don on/off    Pt has no cultural, educational or language barriers to learning provided.    Assessment   John tolerated treatment well. PT attempted to have pt perform prone HS curls with Bioness L300 training mode, pt experienced muscle cramping and was unable to perform. Pt was able to perform HS curls without device on. Pt demonstrates improved left knee stability during stance phase of gait. Pt reports LLE fatigue when ambulating without Bioness. Pt was noted to have some skin breakdown near the distal thigh pad. Pt was instructed to adjust device so pad would not contact open wound. Pt was also instructed to decrease use of Bioness to 2 hrs/ day to allow for skin healing and improve tolerance (skin integrity).  Pt was instructed in scar maassage and massage to decrease edema in left knee. Pt instructed to ice knee daily to decrease swelling. Left knee flexion is not demonstrating significant gains, progress likely limited due to scar tissue and edema. Pt can benefit from continued skilled PT to address remaining impairments to improve safe mobility to return to PLOF.       Pt prognosis is Good. Pt will continue to benefit from skilled outpatient physical therapy to address the deficits listed in the problem list, provide pt/family education and to maximize pt's level of independence in the home and community environment.     Anticipated barriers to physical therapy: PMHx, history of previous PT, anxiety about returning to PLOF    Medical necessity is demonstrated by the following IMPAIRMENTS/PROBLEM LIST:   Fall Risk - impaired balance   Weakness   Decreased ROM  Gait deviations   Decreased ambulation   Decreased activity tolerance   Difficulty participating in daily activities  Continued inability to participate fully in vocational pursuits    Requires skilled supervision to complete and progress HEP      Pt's spiritual, cultural and educational needs considered and pt agreeable to plan of care and GOALS as stated below:   Status as of 2/24/17:   Short term goals: 4-6 weeks, pt agrees to goals set.  1. Pt will perform self ROM for knee flexion to improve mobility to correct gait impairments. Met- pt reports good complaince  2. Pt will demonstrate improved left knee flexion AAROM in sitting to 95 degrees to improve LE mobility for mobility (i.e. Sit<>stand, stairs). Met- 105 degrees in sitting.   3. Pt will perform TUG with Bioness L300 Plus and Lofstrand crutch in 10 sec to demonstrate improved mobility and decreased fall risk. Improved- 10.97 sec with L300 Plus, no AD  4. Pt will demonstrate improved bilateral hip extension strength to 4/5 to improve balance and ambulation. NT  5. Pt will report decreased left knee pain average to 5/10 with weight bearing to improve tolerance to mobility. Met- 5/10 average left knee pain   6. Assess ability to negotiate curbs and ramps and set goals as needed. Met- supervision-mod I with Bioness L300 Plus without AD      Long term goals: 8-12 weeks, pt agrees to goals set  7. 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 Pt will demonstrate improved ambulation velocity to 1.24 m/s to demonstrate a 25% improvement in speed/ mobility. Improved- 0.95 m/s without AD, using L300 plus  9. Pt will ambulate at least 300 ft with Bioness L300 Plus with or without Lofstrand to demonstrate improved ability to negotiate community distances. Met- able to ambulate at least 300 ft with Bioness L300 Plus without   10. Pt will demonstrate improved left knee strength to 4+/5 to improve all mobility, decrease risk of knee buckling, and decrease need for AD for ambulation. NT  11. Pt will report decreased left knee  pain average to 3/10 or less to demonstrate improved tolerance to all mobility see STG 5      Plan   Continue PT 2-3x weekly under established Plan of Care, with treatment to include: pt education, HEP, therapeutic exercises, neuromuscular re-education/balance exercises, therapeutic activities, NMES, joint mobilizations PRN, manual therapy PRN, and modalities PRN, to work towards established goals. Pt may be seen by PTA to carry out plan of care.     La Pickens, PT   03/03/2017

## 2017-03-06 ENCOUNTER — TELEPHONE (OUTPATIENT)
Dept: ORTHOPEDICS | Facility: CLINIC | Age: 57
End: 2017-03-06

## 2017-03-06 ENCOUNTER — CLINICAL SUPPORT (OUTPATIENT)
Dept: REHABILITATION | Facility: HOSPITAL | Age: 57
End: 2017-03-06
Attending: ORTHOPAEDIC SURGERY
Payer: OTHER MISCELLANEOUS

## 2017-03-06 DIAGNOSIS — Z74.09 IMPAIRED FUNCTIONAL MOBILITY, BALANCE, GAIT, AND ENDURANCE: Chronic | ICD-10-CM

## 2017-03-06 DIAGNOSIS — M25.662 DECREASED ROM OF LEFT KNEE: Chronic | ICD-10-CM

## 2017-03-06 DIAGNOSIS — R53.1 DECREASED STRENGTH: Primary | Chronic | ICD-10-CM

## 2017-03-06 DIAGNOSIS — Z78.9 IMPAIRED MOTOR CONTROL: Chronic | ICD-10-CM

## 2017-03-06 PROCEDURE — 97140 MANUAL THERAPY 1/> REGIONS: CPT | Mod: PO

## 2017-03-06 PROCEDURE — 97116 GAIT TRAINING THERAPY: CPT | Mod: PO

## 2017-03-06 PROCEDURE — 97110 THERAPEUTIC EXERCISES: CPT | Mod: PO

## 2017-03-06 NOTE — TELEPHONE ENCOUNTER
----- Message from Alvarez Garcia MD sent at 3/5/2017  9:21 PM CST -----  Regarding: RE: Peer to peer  Contact: [phone]  Please clarify this with Umm.  I spoke to the physician and we faxed him what he needed before I left.  Is this a new one, and if not, what is it pertaining to?  ----- Message -----     From: Yarely Serrano MA     Sent: 3/3/2017   1:16 PM       To: Alvarez Garcia MD  Subject: FW: Peer to peer                                     ----- Message -----     From: Delia Khanna PA-C     Sent: 3/3/2017   1:13 PM       To: Yarely Serrano MA, Umm Butcher  Subject: RE: Peer to peer                                 Dr. Garcia will be back on Monday. The peer to peer can be completed then. Yarely, please have all of the documentation ready for him at that time.    ----- Message -----     From: Yarely Serrano MA     Sent: 3/3/2017   1:11 PM       To: Delia Khanna PA-C  Subject: FW: Peer to peer                                     ----- Message -----     From: Umm Butcher     Sent: 3/3/2017  12:13 PM       To: Jose Ivey  Subject: Peer to peer                                     Good afternoon,    I have received another phone call from the patient's [facility] Dept with a name of the medical reviewer and a contact number...Dr. Mcpherson(sp?) [phone]. Per Dr. Mcpherson's assistant, peer to peer must be completed by end of business 3.6.2017.    Thanks,    Katherin Butcher

## 2017-03-06 NOTE — PROGRESS NOTES
"                                                    Physical Therapy Progress Note     Name: John Macedo  Clinic Number: [id number]  Diagnosis:   Weakness of left lower extremity       S/P revision of total knee, left      Chronic pain of left knee      Difficulty walking        Physician: Alvarez Garcia MD  Treatment Orders: PT Eval and Treat, Bioness trial/ assessment  Past Medical History:   Diagnosis Date    Arthritis     GERD (gastroesophageal reflux disease)        Precautions: standard  Visit #: 10  Date of Eval: 1/27/2017  Plan of Care Expiration: 4/21/2017        Subjective   Pt reports: " I'm hurting today from working in the yard and I feel trying to do yard work.  I tripped over something."     Pain Scale: some LBP noted, 8/10. Left knee pain 5/10    Objective   Pt brought in his own L300 today.  Patient received individual therapy to increase strength, endurance, ROM, flexibility and ambualtion with activities as follows:     Pt participated in gait training x 10 minutes to improve gait technique and consistency:  TM at 2.3 mph x 10 minutes, BUE support      Pt participated in therapeutic exercises x 15 minutes to improve motor control and strength to improve gait:    Recumbent cycle x 8 minutes to improve left knee flexion.  Level 4  Prone left HS curls 2# 3 x 10  SLR with L300 Plus training mode 3# x 2.5 minutes      Pt participated in manual therapy x 15 minutes to address left knee pain and ROM:   Sitting with knee ext: Tool assisted skin rolling over scar, nudging at joint line, IT band massage, tenderness reported as well as increased flexion and decreased tension as per pt report following.      Written Home Exercises: 3/3/17- reviewed scar massage and self massage to decrease edema  2/9/17: issued wall slides to improve knee flexion, prone HS curls, and prone hip hip extension    Education provided re: Educated pt on use of self L300 and how to use the remote control.  Proper way to " don on/off    Pt has no cultural, educational or language barriers to learning provided.    Assessment   John tolerated treatment session fairly well with increased lower back and knee pain from weekend activities.   Pt wasn't able to perform SLR as long today 2* increased back pain but did not relief and less tenderness in the L knee following tool assisted scar massage.   Pt can benefit from continued skilled PT to address remaining impairments to improve safe mobility to return to PLOF.       Pt prognosis is Good. Pt will continue to benefit from skilled outpatient physical therapy to address the deficits listed in the problem list, provide pt/family education and to maximize pt's level of independence in the home and community environment.     Anticipated barriers to physical therapy: PMHx, history of previous PT, anxiety about returning to PLOF    Medical necessity is demonstrated by the following IMPAIRMENTS/PROBLEM LIST:   Fall Risk - impaired balance   Weakness   Decreased ROM  Gait deviations   Decreased ambulation   Decreased activity tolerance   Difficulty participating in daily activities  Continued inability to participate fully in vocational pursuits   Requires skilled supervision to complete and progress HEP      Pt's spiritual, cultural and educational needs considered and pt agreeable to plan of care and GOALS as stated below:   Status as of 2/24/17:   Short term goals: 4-6 weeks, pt agrees to goals set.  1. Pt will perform self ROM for knee flexion to improve mobility to correct gait impairments. Met- pt reports good complaince  2. Pt will demonstrate improved left knee flexion AAROM in sitting to 95 degrees to improve LE mobility for mobility (i.e. Sit<>stand, stairs). Met- 105 degrees in sitting.   3. Pt will perform TUG with Jammcard L300 Plus and Lofstrand crutch in 10 sec to demonstrate improved mobility and decreased fall risk. Improved- 10.97 sec with L300 Plus, no AD  4. Pt will  demonstrate improved bilateral hip extension strength to 4/5 to improve balance and ambulation. NT  5. Pt will report decreased left knee pain average to 5/10 with weight bearing to improve tolerance to mobility. Met- 5/10 average left knee pain   6. Assess ability to negotiate curbs and ramps and set goals as needed. Met- supervision-mod I with Bioness L300 Plus without AD      Long term goals: 8-12 weeks, pt agrees to goals set  7. 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 Pt will demonstrate improved ambulation velocity to 1.24 m/s to demonstrate a 25% improvement in speed/ mobility. Improved- 0.95 m/s without AD, using L300 plus  9. Pt will ambulate at least 300 ft with Bioness L300 Plus with or without Lofstrand to demonstrate improved ability to negotiate community distances. Met- able to ambulate at least 300 ft with Bioness L300 Plus without   10. Pt will demonstrate improved left knee strength to 4+/5 to improve all mobility, decrease risk of knee buckling, and decrease need for AD for ambulation. NT  11. Pt will report decreased left knee pain average to 3/10 or less to demonstrate improved tolerance to all mobility see STG 5      Plan   Continue PT 2-3x weekly under established Plan of Care, with treatment to include: pt education, HEP, therapeutic exercises, neuromuscular re-education/balance exercises, therapeutic activities, NMES, joint mobilizations PRN, manual therapy PRN, and modalities PRN, to work towards established goals. Pt may be seen by PTA to carry out plan of care.     Naomi Newman, PTA   03/06/2017

## 2017-03-08 ENCOUNTER — CLINICAL SUPPORT (OUTPATIENT)
Dept: REHABILITATION | Facility: HOSPITAL | Age: 57
End: 2017-03-08
Attending: ORTHOPAEDIC SURGERY
Payer: OTHER MISCELLANEOUS

## 2017-03-08 DIAGNOSIS — M25.662 DECREASED ROM OF LEFT KNEE: ICD-10-CM

## 2017-03-08 DIAGNOSIS — Z78.9 IMPAIRED MOTOR CONTROL: ICD-10-CM

## 2017-03-08 DIAGNOSIS — R53.1 DECREASED STRENGTH: ICD-10-CM

## 2017-03-08 DIAGNOSIS — Z74.09 IMPAIRED FUNCTIONAL MOBILITY, BALANCE, GAIT, AND ENDURANCE: ICD-10-CM

## 2017-03-08 PROCEDURE — 97110 THERAPEUTIC EXERCISES: CPT | Mod: PO | Performed by: PHYSICAL THERAPIST

## 2017-03-08 PROCEDURE — 97140 MANUAL THERAPY 1/> REGIONS: CPT | Mod: PO | Performed by: PHYSICAL THERAPIST

## 2017-03-08 NOTE — PROGRESS NOTES
Physical Therapy Progress Note     Name: John Macedo  Clinic Number: [id number]  Diagnosis:   Weakness of left lower extremity       S/P revision of total knee, left      Chronic pain of left knee      Difficulty walking        Physician: Alvarez Garcia MD  Treatment Orders: PT Eval and Treat, Bioness trial/ assessment  Past Medical History:   Diagnosis Date    Arthritis     GERD (gastroesophageal reflux disease)        Precautions: standard  Visit #: 11  Date of Eval: 1/27/2017  Plan of Care Expiration: 4/21/2017        Subjective   Pt reports: I'm sore today in the knee and back.      Pain Scale: some LBP noted, 8/10. Left knee pain 5/10 pain ~same at conclusion of session    Objective   Pt brought in his own L300 today.  Patient received individual therapy to increase strength, endurance, ROM, flexibility and ambualtion with activities as follows:     Pt participated in gait training x 0 minutes to improve gait technique and consistency:  TM at 2.3 mph x 10 minutes, BUE support- NP today due to focus on improving knee ROM      Pt participated in therapeutic exercises x 20 minutes to improve motor control and strength to improve gait:    Left knee ROM in sitting= 90 degrees--> 95 degrees (after MT)  upright cycle x 5 minutes to improve left knee flexion.  Level 1  Prone left HS curls 3# 3 x 10  LTR 10x  Prone hip ext 2 x 10    SLR with L300 Plus training mode 3# x 2.5 minutes- NP today      Pt participated in manual therapy x 25 minutes to address left knee pain and ROM:  GT in sitting:  Scar massage, nudging at joint line, nudging at medial quads and  IT band massage, tenderness reported medially near adductor insertion. Pt also reported  decreased pain with increased flexion.  GT in supine: sweeping/ fanning GT 4 adductors, nudging at adductor tendon    Written Home Exercises: 3/3/17- reviewed scar massage and self massage to decrease  edema  2/9/17: issued wall slides to improve knee flexion, prone HS curls, and prone hip hip extension    Education provided re: Educated pt on use of self L300 and how to use the remote control.  Proper way to don on/off    Pt has no cultural, educational or language barriers to learning provided.    Assessment   John tolerated treatment session well.   Pt continues to report LBP that is no resolved with sitting. Pt was instructed in core strengthening exercises and provided with LTR and prone hip ext to address LBP. Pt reported decreased LBP/ stretching with LTR. Pt reported spine extension increased pain, flexion did not affect pain. Pt demonstrated minimal improvement in left knee flexion with manual therapy. Left knee flexion is still significantly limited for good technique for sit<>stand and stair negotiation. Pt reports good compliance with HEP for ROM. Pt did indicate that knee flexion felt better/ not as painful after manual therapy treatment. Pt can benefit from continued skilled PT to address remaining impairments to improve safe mobility to return to PLOF.       Pt prognosis is Good. Pt will continue to benefit from skilled outpatient physical therapy to address the deficits listed in the problem list, provide pt/family education and to maximize pt's level of independence in the home and community environment.     Anticipated barriers to physical therapy: PMHx, history of previous PT, anxiety about returning to PLOF    Medical necessity is demonstrated by the following IMPAIRMENTS/PROBLEM LIST:   Fall Risk - impaired balance   Weakness   Decreased ROM  Gait deviations   Decreased ambulation   Decreased activity tolerance   Difficulty participating in daily activities  Continued inability to participate fully in vocational pursuits   Requires skilled supervision to complete and progress HEP      Pt's spiritual, cultural and educational needs considered and pt agreeable to plan of care and GOALS as  stated below:   Status as of 2/24/17:   Short term goals: 4-6 weeks, pt agrees to goals set.  1. Pt will perform self ROM for knee flexion to improve mobility to correct gait impairments. Met- pt reports good complaince  2. Pt will demonstrate improved left knee flexion AAROM in sitting to 95 degrees to improve LE mobility for mobility (i.e. Sit<>stand, stairs). Met- 105 degrees in sitting.   3. Pt will perform TUG with Bioness L300 Plus and Lofstrand crutch in 10 sec to demonstrate improved mobility and decreased fall risk. Improved- 10.97 sec with L300 Plus, no AD  4. Pt will demonstrate improved bilateral hip extension strength to 4/5 to improve balance and ambulation. NT  5. Pt will report decreased left knee pain average to 5/10 with weight bearing to improve tolerance to mobility. Met- 5/10 average left knee pain   6. Assess ability to negotiate curbs and ramps and set goals as needed. Met- supervision-mod I with Bioness L300 Plus without AD      Long term goals: 8-12 weeks, pt agrees to goals set  7. 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 Pt will demonstrate improved ambulation velocity to 1.24 m/s to demonstrate a 25% improvement in speed/ mobility. Improved- 0.95 m/s without AD, using L300 plus  9. Pt will ambulate at least 300 ft with Bioness L300 Plus with or without Lofstrand to demonstrate improved ability to negotiate community distances. Met- able to ambulate at least 300 ft with Bioness L300 Plus without   10. Pt will demonstrate improved left knee strength to 4+/5 to improve all mobility, decrease risk of knee buckling, and decrease need for AD for ambulation. NT  11. Pt will report decreased left knee pain average to 3/10 or less to demonstrate improved tolerance to all mobility see STG 5      Plan   Continue PT 2-3x weekly under established Plan of Care, with treatment  to include: pt education, HEP, therapeutic exercises, neuromuscular re-education/balance exercises, therapeutic activities, NMES, joint mobilizations PRN, manual therapy PRN, and modalities PRN, to work towards established goals. Pt may be seen by PTA to carry out plan of care.     La Pickens, PT   03/08/2017

## 2017-03-13 ENCOUNTER — DOCUMENTATION ONLY (OUTPATIENT)
Dept: REHABILITATION | Facility: HOSPITAL | Age: 57
End: 2017-03-13

## 2017-03-13 ENCOUNTER — CLINICAL SUPPORT (OUTPATIENT)
Dept: REHABILITATION | Facility: HOSPITAL | Age: 57
End: 2017-03-13
Attending: ORTHOPAEDIC SURGERY
Payer: OTHER MISCELLANEOUS

## 2017-03-13 DIAGNOSIS — R53.1 DECREASED STRENGTH: ICD-10-CM

## 2017-03-13 DIAGNOSIS — M25.662 DECREASED ROM OF LEFT KNEE: ICD-10-CM

## 2017-03-13 DIAGNOSIS — G89.29 CHRONIC PAIN OF LEFT KNEE: Primary | ICD-10-CM

## 2017-03-13 DIAGNOSIS — M54.40 CHRONIC RIGHT-SIDED LOW BACK PAIN WITH SCIATICA, SCIATICA LATERALITY UNSPECIFIED: ICD-10-CM

## 2017-03-13 DIAGNOSIS — Z74.09 IMPAIRED FUNCTIONAL MOBILITY, BALANCE, GAIT, AND ENDURANCE: ICD-10-CM

## 2017-03-13 DIAGNOSIS — Z78.9 IMPAIRED MOTOR CONTROL: ICD-10-CM

## 2017-03-13 DIAGNOSIS — G89.29 CHRONIC RIGHT-SIDED LOW BACK PAIN WITH SCIATICA, SCIATICA LATERALITY UNSPECIFIED: ICD-10-CM

## 2017-03-13 DIAGNOSIS — M25.562 CHRONIC PAIN OF LEFT KNEE: Primary | ICD-10-CM

## 2017-03-13 PROCEDURE — 97110 THERAPEUTIC EXERCISES: CPT | Mod: PO | Performed by: PHYSICAL THERAPIST

## 2017-03-13 PROCEDURE — 97140 MANUAL THERAPY 1/> REGIONS: CPT | Mod: PO | Performed by: PHYSICAL THERAPIST

## 2017-03-13 NOTE — PROGRESS NOTES
Physical Therapy Progress Note     Name: John Macedo  Clinic Number: [id number]  Diagnosis:   Weakness of left lower extremity       S/P revision of total knee, left      Chronic pain of left knee      Difficulty walking        Physician: Alvarez Garcia MD  Treatment Orders: PT Eval and Treat, Bioness trial/ assessment  Past Medical History:   Diagnosis Date    Arthritis     GERD (gastroesophageal reflux disease)        Precautions: standard  Visit #: 12  Date of Eval: 1/27/2017  Plan of Care Expiration: 4/21/2017        Subjective   Pt reports: I was doing housework for 3 hrs yesterday. New breakdown on thigh from L300 plus.    Pain Scale: some LBP noted, 7/10. Left knee pain 6/10 pain, no significant change at conclusion of session.     Objective   Pt arrived with 1 Lofstrand crutch and no L300 plus today:     Patient received individual therapy to increase strength, endurance, ROM, flexibility and ambualtion with activities as follows:     Pt participated in gait training x 0 minutes to improve gait technique and consistency:  TM at 2.3 mph x 10 minutes, BUE support- NP today due to focus on improving knee ROM      Pt participated in therapeutic exercises x 8 minutes to improve motor control and strength to improve gait:    Left knee ROM in sitting= 90 degrees--> 100 degrees (after MT)  Recumbent cycle x 5 minutes to improve left knee flexion.  Level 1    Pt performed that following exercises supervised with PT tech:   Prone left HS curls 3# 3 x 10  SLR 3# 5 sec 3 x 10  Prone hip ext 3 x 10    Pt participated in manual therapy x 20 minutes to address left knee pain and ROM:  GT in sitting:  Scar massage/ c-bowing (distal>proximal), nudging at medial quads and  IT band, sweeping/ fanning of quads.   GT in prone: sweeping/ fanning GT 4 HS, nudging at all HS tendon distal insertions, rocking along hamstrings- performed with knee flexion and  extension  Contract- relax- contact in prone for left knee flex.    Written Home Exercises: 3/3/17- reviewed scar massage and self massage to decrease edema  2/9/17: issued wall slides to improve knee flexion, prone HS curls, and prone hip hip extension    Education provided re: Educated pt on use of self L300 and how to use the remote control.  Proper way to don on/off    Pt has no cultural, educational or language barriers to learning provided.    Assessment   John tolerated treatment session well.   Pt continues to report LBP that radiates into RLE. Pt did not wear Bioness L300 Plus to today's session due to new skin breakdown. Pt was re educated on wearing schedule and noting tolerance. Pt was informed that device does not need to be in use in order to cause decreased skin integrity. PT continued to address left knee flexion ROM to improve pt's ability to perform sit<>stand, squat, perform car transfers, and negotiate stairs. PT address ROM via use of tool assisted manual therapy and scar massage. Pt presents with possible scar tissue in hamstrings and IT band that may by impairing ROM. Pt has a hard end feel for flexion upon arriving to PT. PT was able to assist pt with attaining more knee flexion with decreased resistance after manual therapy and scar massage were performed. PT did not address LBP this session as concentration was on improving knee ROM. Pt can benefit from continued skilled PT to address remaining impairments to improve safe mobility to return to PLOF.     Pt prognosis is Good. Pt will continue to benefit from skilled outpatient physical therapy to address the deficits listed in the problem list, provide pt/family education and to maximize pt's level of independence in the home and community environment.     Anticipated barriers to physical therapy: PMHx, history of previous PT, anxiety about returning to PLOF    Medical necessity is demonstrated by the following IMPAIRMENTS/PROBLEM LIST:    Fall Risk - impaired balance   Weakness   Decreased ROM  Gait deviations   Decreased ambulation   Decreased activity tolerance   Difficulty participating in daily activities  Continued inability to participate fully in vocational pursuits   Requires skilled supervision to complete and progress HEP      Pt's spiritual, cultural and educational needs considered and pt agreeable to plan of care and GOALS as stated below:   Status as of 2/24/17:   Short term goals: 4-6 weeks, pt agrees to goals set.  1. Pt will perform self ROM for knee flexion to improve mobility to correct gait impairments. Met- pt reports good complaince  2. Pt will demonstrate improved left knee flexion AAROM in sitting to 95 degrees to improve LE mobility for mobility (i.e. Sit<>stand, stairs). Met- 105 degrees in sitting.   3. Pt will perform TUG with Bioness L300 Plus and Lofstrand crutch in 10 sec to demonstrate improved mobility and decreased fall risk. Improved- 10.97 sec with L300 Plus, no AD  4. Pt will demonstrate improved bilateral hip extension strength to 4/5 to improve balance and ambulation. NT  5. Pt will report decreased left knee pain average to 5/10 with weight bearing to improve tolerance to mobility. Met- 5/10 average left knee pain   6. Assess ability to negotiate curbs and ramps and set goals as needed. Met- supervision-mod I with Bioness L300 Plus without AD      Long term goals: 8-12 weeks, pt agrees to goals set  7. 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 Pt will demonstrate improved ambulation velocity to 1.24 m/s to demonstrate a 25% improvement in speed/ mobility. Improved- 0.95 m/s without AD, using L300 plus  9. Pt will ambulate at least 300 ft with Bioness L300 Plus with or without Lofstrand to demonstrate improved ability to negotiate community distances. Met- able to ambulate at least 300  ft with Bioness L300 Plus without   10. Pt will demonstrate improved left knee strength to 4+/5 to improve all mobility, decrease risk of knee buckling, and decrease need for AD for ambulation. NT  11. Pt will report decreased left knee pain average to 3/10 or less to demonstrate improved tolerance to all mobility see STG 5      Plan   Continue PT 2-3x weekly under established Plan of Care, with treatment to include: pt education, HEP, therapeutic exercises, neuromuscular re-education/balance exercises, therapeutic activities, NMES, joint mobilizations PRN, manual therapy PRN, and modalities PRN, to work towards established goals. Pt may be seen by PTA to carry out plan of care.     La Pickens, PT   03/13/2017

## 2017-03-13 NOTE — PROGRESS NOTES
I, La Pickens DPT, met with Naomi Newman PTA to discuss this pt's POC. Check skin integrity, pt had an area of open skin near distal pad on left thigh. Educate pt on wearing schedule if needed. Concentrate on improving left knee flexion >90 degrees. Toll assisted manual therapy for distal scar massage, hamstrings  (medial>lateral), and IT band. Pt is participating in prone HS curls, prone hip extension, and SLR. Also address core strengthening and neural glide on RLE. Pt may use MHP for LBP.    La Pickens DPT  3/13/2017    Face to face consultation with supervision PT held on 03/13/2017    Naomi Newman PTA

## 2017-03-21 ENCOUNTER — CLINICAL SUPPORT (OUTPATIENT)
Dept: REHABILITATION | Facility: HOSPITAL | Age: 57
End: 2017-03-21
Attending: ORTHOPAEDIC SURGERY
Payer: OTHER MISCELLANEOUS

## 2017-03-21 DIAGNOSIS — Z74.09 IMPAIRED FUNCTIONAL MOBILITY, BALANCE, GAIT, AND ENDURANCE: Chronic | ICD-10-CM

## 2017-03-21 DIAGNOSIS — G89.29 CHRONIC RIGHT-SIDED LOW BACK PAIN WITH SCIATICA, SCIATICA LATERALITY UNSPECIFIED: ICD-10-CM

## 2017-03-21 DIAGNOSIS — M54.40 CHRONIC RIGHT-SIDED LOW BACK PAIN WITH SCIATICA, SCIATICA LATERALITY UNSPECIFIED: ICD-10-CM

## 2017-03-21 DIAGNOSIS — M25.662 DECREASED ROM OF LEFT KNEE: Chronic | ICD-10-CM

## 2017-03-21 DIAGNOSIS — G89.29 CHRONIC PAIN OF LEFT KNEE: ICD-10-CM

## 2017-03-21 DIAGNOSIS — Z78.9 IMPAIRED MOTOR CONTROL: Chronic | ICD-10-CM

## 2017-03-21 DIAGNOSIS — M25.562 CHRONIC PAIN OF LEFT KNEE: ICD-10-CM

## 2017-03-21 DIAGNOSIS — R53.1 DECREASED STRENGTH: Primary | Chronic | ICD-10-CM

## 2017-03-21 PROCEDURE — 97110 THERAPEUTIC EXERCISES: CPT | Mod: PO

## 2017-03-21 PROCEDURE — 97140 MANUAL THERAPY 1/> REGIONS: CPT | Mod: PO

## 2017-03-21 NOTE — PROGRESS NOTES
"                                                    Physical Therapy Progress Note     Name: John Macedo  Clinic Number: [id number]  Diagnosis:   Weakness of left lower extremity       S/P revision of total knee, left      Chronic pain of left knee      Difficulty walking        Physician: Alvarez Garcia MD  Treatment Orders: PT Eval and Treat, Bioness trial/ assessment  Past Medical History:   Diagnosis Date    Arthritis     GERD (gastroesophageal reflux disease)        Precautions: standard  Visit #: 13  Date of Eval: 1/27/2017  Plan of Care Expiration: 4/21/2017        Subjective   Pt reports: " I this this scar tissue is really hurting me from bending my knee."     Pain Scale: some LBP noted, 7/10. Left knee pain 4/10 pain, no significant change at conclusion of session.     Objective   Pt arrived L300 plus today:     Patient received individual therapy to increase strength, endurance, ROM, flexibility and ambualtion with activities as follows:     Pt participated in gait training x 0 minutes to improve gait technique and consistency:    Pt participated in therapeutic exercises x 30 minutes to improve motor control and strength to improve gait:    Pt supine on moist heat (10min) while performing   X 20 reps of pelvic tilts   LTR x 30 reps    2 x 10 reps of B LE SLR    2 x 10 reps Prone LLE  HS curls with #3 lb cuff weights   Left knee ROM in sitting= 90 degrees--> 100 degrees (after MT)  Recumbent cycle x 5 minutes to improve left knee flexion.  Level 1    Pt performed that following exercises supervised with PT tech:     Pt participated in manual therapy x 15 minutes to address left knee pain and ROM:  Scar massage(distal>proximal) in sitting, nudging at medial quads and  IT band, sweeping/ fanning of quads.   Assisted tool scar massage  in prone: sweeping/ fanning 4 HS, nudging at all HS tendon distal insertions, rocking along hamstrings- performed with knee flexion       Written Home Exercises: " 3/3/17- reviewed scar massage and self massage to decrease edema  2/9/17: issued wall slides to improve knee flexion, prone HS curls, and prone hip hip extension    Education provided re: Educated pt on use of self L300 and how to use the remote control.  Proper way to don on/off    Pt has no cultural, educational or language barriers to learning provided.    Assessment   John tolerated treatment session well and did not have any problems.  Pt with increased lower back pain and received moist heat and performed some exercises to help decrease lower back pain.  Pt reported some relief following tx session in his lower back and area around his knee.  Pt can benefit from continued skilled PT to address remaining impairments to improve safe mobility to return to PLOF.     Pt prognosis is Good. Pt will continue to benefit from skilled outpatient physical therapy to address the deficits listed in the problem list, provide pt/family education and to maximize pt's level of independence in the home and community environment.     Anticipated barriers to physical therapy: PMHx, history of previous PT, anxiety about returning to PLOF    Medical necessity is demonstrated by the following IMPAIRMENTS/PROBLEM LIST:   Fall Risk - impaired balance   Weakness   Decreased ROM  Gait deviations   Decreased ambulation   Decreased activity tolerance   Difficulty participating in daily activities  Continued inability to participate fully in vocational pursuits   Requires skilled supervision to complete and progress HEP      Pt's spiritual, cultural and educational needs considered and pt agreeable to plan of care and GOALS as stated below:   Status as of 2/24/17:   Short term goals: 4-6 weeks, pt agrees to goals set.  1. Pt will perform self ROM for knee flexion to improve mobility to correct gait impairments. Met- pt reports good complaince  2. Pt will demonstrate improved left knee flexion AAROM in sitting to 95 degrees to improve LE  mobility for mobility (i.e. Sit<>stand, stairs). Met- 105 degrees in sitting.   3. Pt will perform TUG with Bioness L300 Plus and Lofstrand crutch in 10 sec to demonstrate improved mobility and decreased fall risk. Improved- 10.97 sec with L300 Plus, no AD  4. Pt will demonstrate improved bilateral hip extension strength to 4/5 to improve balance and ambulation. NT  5. Pt will report decreased left knee pain average to 5/10 with weight bearing to improve tolerance to mobility. Met- 5/10 average left knee pain   6. Assess ability to negotiate curbs and ramps and set goals as needed. Met- supervision-mod I with Bioness L300 Plus without AD      Long term goals: 8-12 weeks, pt agrees to goals set  7. 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 Pt will demonstrate improved ambulation velocity to 1.24 m/s to demonstrate a 25% improvement in speed/ mobility. Improved- 0.95 m/s without AD, using L300 plus  9. Pt will ambulate at least 300 ft with Bioness L300 Plus with or without Lofstrand to demonstrate improved ability to negotiate community distances. Met- able to ambulate at least 300 ft with Bioness L300 Plus without   10. Pt will demonstrate improved left knee strength to 4+/5 to improve all mobility, decrease risk of knee buckling, and decrease need for AD for ambulation. NT  11. Pt will report decreased left knee pain average to 3/10 or less to demonstrate improved tolerance to all mobility see STG 5      Plan   Continue PT 2-3x weekly under established Plan of Care, with treatment to include: pt education, HEP, therapeutic exercises, neuromuscular re-education/balance exercises, therapeutic activities, NMES, joint mobilizations PRN, manual therapy PRN, and modalities PRN, to work towards established goals. Pt may be seen by PTA to carry out plan of care.     Naomi Newman, PTA    03/21/2017

## 2017-03-24 ENCOUNTER — CLINICAL SUPPORT (OUTPATIENT)
Dept: REHABILITATION | Facility: HOSPITAL | Age: 57
End: 2017-03-24
Attending: ORTHOPAEDIC SURGERY
Payer: OTHER MISCELLANEOUS

## 2017-03-24 DIAGNOSIS — M25.662 DECREASED ROM OF LEFT KNEE: ICD-10-CM

## 2017-03-24 DIAGNOSIS — Z74.09 IMPAIRED FUNCTIONAL MOBILITY, BALANCE, GAIT, AND ENDURANCE: ICD-10-CM

## 2017-03-24 DIAGNOSIS — Z78.9 IMPAIRED MOTOR CONTROL: ICD-10-CM

## 2017-03-24 DIAGNOSIS — R53.1 DECREASED STRENGTH: ICD-10-CM

## 2017-03-24 PROCEDURE — 97110 THERAPEUTIC EXERCISES: CPT | Mod: PO | Performed by: PHYSICAL THERAPIST

## 2017-03-24 NOTE — PROGRESS NOTES
"                                                    Physical Therapy Progress Note     Name: John Macedo  Clinic Number: [id number]  Diagnosis:   Weakness of left lower extremity       S/P revision of total knee, left      Chronic pain of left knee      Difficulty walking        Physician: Alvarez Garcia MD  Treatment Orders: PT Eval and Treat, Bioness trial/ assessment  Past Medical History:   Diagnosis Date    Arthritis     GERD (gastroesophageal reflux disease)        Precautions: standard  Visit #: 14  Date of Eval: 1/27/2017  Plan of Care Expiration: 4/21/2017        Subjective   Pt reports: "my leg is swelling a lot lately"    Pain Scale: some LBP noted, 7/10. Left knee pain 5/10 pain (pt reports taking pain meds this morning. Average left knee pain= 4/10. Pain the same after treatment    Objective   Pt arrived L300 plus today:     Patient received individual therapy to increase strength, endurance, ROM, flexibility and ambualtion with activities as follows:     Pt participated in therapeutic exercises x 44 minutes to improve motor control and strength to improve gait:    Pt supine on moist heat (17 min) while performing   SKTC 10 sec x 5   Pelvic tilt 5 sec 3 x 10   LTR x 10 reps    3 x 10 reps of B LE SLR 3 #    LLE strength:   Left Knee ext= 4/5  Left Knee flex= 4/5  Left hip ext= 4-/5    Prone: 3 x 10 LLE  HS curls with #3 lb cuff weights    Left hip ext 3# 3 x 10    Left knee ROM in sitting= 90 degrees--> 102 degrees   SSWS= 6m in 5.61 sec= 1.07 m/s  FSWS= 6m in 5.16 sec= 1.16 sec    Upright cycle x 5 minutes to improve left knee flexion.  Level 1       Written Home Exercises: 3/3/17- reviewed scar massage and self massage to decrease edema  2/9/17: issued wall slides to improve knee flexion, prone HS curls, and prone hip hip extension    Education provided re: Educated pt on use of self L300 and how to use the remote control.  Proper way to don on/off    Pt has no cultural, educational or " language barriers to learning provided.    Assessment   Assessment period: 3/1/17 to 3/24/17. John tolerates treatment sessions well.  Pt is using [employer] L300 Plus on a regular basis for ~ 2-3 hrs daily. Pt reports improved skin integrity after decreasing wearing time. Pt continues to demonstrate a significant decrease in left knee flexion. Attempts with tool assisted manual therapy to improve ROM has had limited effect. Pt presents with edema in LLE (entire leg). Pt reports elevating and icing leg and also occasional use of LAURENT hose.  Pt continues to ambulate with an antalgic gait pattern which is likely increasing LBP. PT is addressing LBP with ROM and strengthening exercises. Overall, pt is demonstrating good improvements with ambulation velocity/ ability and LLE strength. Left knee ROM limits stair climbing.  Pt can benefit from continued skilled PT to address remaining impairments to improve safe mobility to return to PLOF.     Pt prognosis is Good. Pt will continue to benefit from skilled outpatient physical therapy to address the deficits listed in the problem list, provide pt/family education and to maximize pt's level of independence in the home and community environment.     Anticipated barriers to physical therapy: PMHx, history of previous PT, anxiety about returning to PLOF    Medical necessity is demonstrated by the following IMPAIRMENTS/PROBLEM LIST:   Fall Risk - impaired balance   Weakness   Decreased ROM  Gait deviations   Decreased ambulation   Decreased activity tolerance   Difficulty participating in daily activities  Continued inability to participate fully in vocational pursuits   Requires skilled supervision to complete and progress HEP      Pt's spiritual, cultural and educational needs considered and pt agreeable to plan of care and GOALS as stated below:   Status as of 3/24/17:   Short term goals: 4-6 weeks, pt agrees to goals set.  1. Pt will perform self ROM for knee flexion to  improve mobility to correct gait impairments. Met- pt reports good complaince  2. Pt will demonstrate improved left knee flexion AAROM in sitting to 95 degrees to improve LE mobility for mobility (i.e. Sit<>stand, stairs). Met/ varies- ~90 degrees prior to treatment, ~100 degrees after treatment.   3. Pt will perform TUG with Bioness L300 Plus and Lofstrand crutch in 10 sec to demonstrate improved mobility and decreased fall risk. NT  4. Pt will demonstrate improved bilateral hip extension strength to 4/5 to improve balance and ambulation. Improved- left= 4-/5  5. Pt will report decreased left knee pain average to 5/10 with weight bearing to improve tolerance to mobility. Met previously  6. Assess ability to negotiate curbs and ramps and set goals as needed. Met- supervision-mod I with Bioness L300 Plus without AD      Long term goals: 8-12 weeks, pt agrees to goals set  7. 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 Pt will demonstrate improved ambulation velocity to 1.24 m/s to demonstrate a 25% improvement in speed/ mobility. Improved- 1.07 m/s without AD, using L300 plus  9. Pt will ambulate at least 300 ft with Bioness L300 Plus with or without Lofstrand to demonstrate improved ability to negotiate community distances. Met previously  10. Pt will demonstrate improved left knee strength to 4+/5 to improve all mobility, decrease risk of knee buckling, and decrease need for AD for ambulation. Improved- flex/ ext= 4/5  11. Pt will report decreased left knee pain average to 3/10 or less to demonstrate improved tolerance to all mobility improved- per pt average pain 4/10 left knee.      Plan   Continue PT 2-3x weekly under established Plan of Care, with treatment to include: pt education, HEP, therapeutic exercises, neuromuscular re-education/balance exercises, therapeutic activities, NMES,  joint mobilizations PRN, manual therapy PRN, and modalities PRN, to work towards established goals. Pt may be seen by PTA to carry out plan of care.     La Pickens, PT   03/24/2017

## 2017-03-24 NOTE — PLAN OF CARE
"                                                    Physical Therapy Progress Note     Name: John Macedo  Clinic Number: [id number]  Diagnosis:   Weakness of left lower extremity       S/P revision of total knee, left      Chronic pain of left knee      Difficulty walking        Physician: Alvarez Garcia MD  Treatment Orders: PT Eval and Treat, Bioness trial/ assessment  Past Medical History:   Diagnosis Date    Arthritis     GERD (gastroesophageal reflux disease)        Precautions: standard  Visit #: 14  Date of Eval: 1/27/2017  Plan of Care Expiration: 4/21/2017        Subjective   Pt reports: "my leg is swelling a lot lately"    Pain Scale: some LBP noted, 7/10. Left knee pain 5/10 pain (pt reports taking pain meds this morning. Average left knee pain= 4/10. Pain the same after treatment    Objective   Pt arrived L300 plus today:     Patient received individual therapy to increase strength, endurance, ROM, flexibility and ambualtion with activities as follows:     Pt participated in therapeutic exercises x 44 minutes to improve motor control and strength to improve gait:    Pt supine on moist heat (17 min) while performing   SKTC 10 sec x 5   Pelvic tilt 5 sec 3 x 10   LTR x 10 reps    3 x 10 reps of B LE SLR 3 #    LLE strength:   Left Knee ext= 4/5  Left Knee flex= 4/5  Left hip ext= 4-/5    Prone: 3 x 10 LLE  HS curls with #3 lb cuff weights    Left hip ext 3# 3 x 10    Left knee ROM in sitting= 90 degrees--> 102 degrees   SSWS= 6m in 5.61 sec= 1.07 m/s  FSWS= 6m in 5.16 sec= 1.16 sec    Upright cycle x 5 minutes to improve left knee flexion.  Level 1       Written Home Exercises: 3/3/17- reviewed scar massage and self massage to decrease edema  2/9/17: issued wall slides to improve knee flexion, prone HS curls, and prone hip hip extension    Education provided re: Educated pt on use of self L300 and how to use the remote control.  Proper way to don on/off    Pt has no cultural, educational or " language barriers to learning provided.    Assessment   Assessment period: 3/1/17 to 3/24/17. John tolerates treatment sessions well.  Pt is using [employer] L300 Plus on a regular basis for ~ 2-3 hrs daily. Pt reports improved skin integrity after decreasing wearing time. Pt continues to demonstrate a significant decrease in left knee flexion. Attempts with tool assisted manual therapy to improve ROM has had limited effect. Pt presents with edema in LLE (entire leg). Pt reports elevating and icing leg and also occasional use of LAURENT hose.  Pt continues to ambulate with an antalgic gait pattern which is likely increasing LBP. PT is addressing LBP with ROM and strengthening exercises. Overall, pt is demonstrating good improvements with ambulation velocity/ ability and LLE strength. Left knee ROM limits stair climbing.  Pt can benefit from continued skilled PT to address remaining impairments to improve safe mobility to return to PLOF.     Pt prognosis is Good. Pt will continue to benefit from skilled outpatient physical therapy to address the deficits listed in the problem list, provide pt/family education and to maximize pt's level of independence in the home and community environment.     Anticipated barriers to physical therapy: PMHx, history of previous PT, anxiety about returning to PLOF    Medical necessity is demonstrated by the following IMPAIRMENTS/PROBLEM LIST:   Fall Risk - impaired balance   Weakness   Decreased ROM  Gait deviations   Decreased ambulation   Decreased activity tolerance   Difficulty participating in daily activities  Continued inability to participate fully in vocational pursuits   Requires skilled supervision to complete and progress HEP      Pt's spiritual, cultural and educational needs considered and pt agreeable to plan of care and GOALS as stated below:   Status as of 3/24/17:   Short term goals: 4-6 weeks, pt agrees to goals set.  1. Pt will perform self ROM for knee flexion to  improve mobility to correct gait impairments. Met- pt reports good complaince  2. Pt will demonstrate improved left knee flexion AAROM in sitting to 95 degrees to improve LE mobility for mobility (i.e. Sit<>stand, stairs). Met/ varies- ~90 degrees prior to treatment, ~100 degrees after treatment.   3. Pt will perform TUG with Bioness L300 Plus and Lofstrand crutch in 10 sec to demonstrate improved mobility and decreased fall risk. NT  4. Pt will demonstrate improved bilateral hip extension strength to 4/5 to improve balance and ambulation. Improved- left= 4-/5  5. Pt will report decreased left knee pain average to 5/10 with weight bearing to improve tolerance to mobility. Met previously  6. Assess ability to negotiate curbs and ramps and set goals as needed. Met- supervision-mod I with Bioness L300 Plus without AD      Long term goals: 8-12 weeks, pt agrees to goals set  7. 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 Pt will demonstrate improved ambulation velocity to 1.24 m/s to demonstrate a 25% improvement in speed/ mobility. Improved- 1.07 m/s without AD, using L300 plus  9. Pt will ambulate at least 300 ft with Bioness L300 Plus with or without Lofstrand to demonstrate improved ability to negotiate community distances. Met previously  10. Pt will demonstrate improved left knee strength to 4+/5 to improve all mobility, decrease risk of knee buckling, and decrease need for AD for ambulation. Improved- flex/ ext= 4/5  11. Pt will report decreased left knee pain average to 3/10 or less to demonstrate improved tolerance to all mobility improved- per pt average pain 4/10 left knee.      Plan   Continue PT 2-3x weekly under established Plan of Care, with treatment to include: pt education, HEP, therapeutic exercises, neuromuscular re-education/balance exercises, therapeutic activities, NMES,  joint mobilizations PRN, manual therapy PRN, and modalities PRN, to work towards established goals. Pt may be seen by PTA to carry out plan of care.     La Pickens, PT   03/24/2017    I certify the need for these services furnished under this plan of treatment and while under my care.  ____________________________________ Physician/Referring Practitioner   Date of Signature

## 2017-03-25 ENCOUNTER — HOSPITAL ENCOUNTER (EMERGENCY)
Facility: HOSPITAL | Age: 57
Discharge: HOME | End: 2017-03-26
Attending: EMERGENCY MEDICINE
Payer: OTHER MISCELLANEOUS

## 2017-03-25 VITALS
BODY MASS INDEX: 35.55 KG/M2 | TEMPERATURE: 98 F | HEIGHT: 69 IN | SYSTOLIC BLOOD PRESSURE: 143 MMHG | OXYGEN SATURATION: 97 % | DIASTOLIC BLOOD PRESSURE: 96 MMHG | RESPIRATION RATE: 81 BRPM | WEIGHT: 240 LBS | HEART RATE: 80 BPM

## 2017-03-25 DIAGNOSIS — M25.562 LEFT KNEE PAIN: ICD-10-CM

## 2017-03-25 PROCEDURE — 99284 EMERGENCY DEPT VISIT MOD MDM: CPT | Mod: ,,, | Performed by: EMERGENCY MEDICINE

## 2017-03-25 PROCEDURE — 99283 EMERGENCY DEPT VISIT LOW MDM: CPT | Mod: 25

## 2017-03-25 NOTE — ED AVS SNAPSHOT
[facility]  [address]  [facility] [address]               John RAMOS Hellen   3/25/2017 11:59 PM   ED    Description:  Male : 1960   Department:  [facility]           Your Care was Coordinated By:     Provider Role From To    John Harmon MD Attending Provider 17 0016 --      Reason for Visit     Joint Swelling     Back Pain           Diagnoses this Visit        Comments    Left knee pain           ED Disposition     None           To Do List           Follow-up Information     Schedule an appointment as soon as possible for a visit with Alvarez Garcia MD.    Specialty:  Orthopedic Surgery    Contact information:    [address]  [facility] [address]  [phone]         These Medications        Disp Refills Start End    diclofenac (VOLTAREN) 75 MG EC tablet 60 tablet 0 3/26/2017     Take 1 tablet (75 mg total) by mouth 2 (two) times daily. - Oral    Pharmacy: [facility]/pharmacy #[id number] - [place] [id number] [facility] Ph #: [phone]       cyclobenzaprine (FLEXERIL) 10 MG tablet 15 tablet 0 3/26/2017 3/31/2017    Take 1 tablet (10 mg total) by mouth 3 (three) times daily as needed for Muscle spasms. - Oral    Pharmacy: [facility]/pharmacy #[id number] - [place] [id number] [facility] Ph #: [phone]         [facility]     [facility] -  Assistance  Registered nurses in the [facility] provide clinical advisement, health education, appointment booking, and other advisory services.  Call for this free service at [phone].             Medications           Message regarding Medications     Verify the changes and/or additions to your medication regime listed below are the same as discussed with your clinician today.  If any of these changes or additions are incorrect, please notify your healthcare provider.        START taking these NEW medications        Refills     "diclofenac (VOLTAREN) 75 MG EC tablet 0    Sig: Take 1 tablet (75 mg total) by mouth 2 (two) times daily.    Class: Print    Route: Oral    cyclobenzaprine (FLEXERIL) 10 MG tablet 0    Sig: Take 1 tablet (10 mg total) by mouth 3 (three) times daily as needed for Muscle spasms.    Class: Print    Route: Oral      These medications were administered today        Dose Freq    ketorolac injection 15 mg 15 mg ED 1 Time    Sig: Inject 15 mg into the muscle ED 1 Time.    Class: Normal    Route: Intramuscular    orphenadrine injection 60 mg 60 mg ED 1 Time    Sig: Inject 2 mLs (60 mg total) into the muscle ED 1 Time.    Class: Normal    Route: Intramuscular           Verify that the below list of medications is an accurate representation of the medications you are currently taking.  If none reported, the list may be blank. If incorrect, please contact your healthcare provider. Carry this list with you in case of emergency.           Current Medications     omeprazole (PRILOSEC OTC) 20 MG tablet Take 20 mg by mouth every morning.     oxycodone-acetaminophen (PERCOCET)  mg per tablet Take 1 tablet by mouth every 4 (four) hours as needed.    cyclobenzaprine (FLEXERIL) 10 MG tablet Take 1 tablet (10 mg total) by mouth 3 (three) times daily as needed for Muscle spasms.    diclofenac (VOLTAREN) 75 MG EC tablet Take 1 tablet (75 mg total) by mouth 2 (two) times daily.           Clinical Reference Information           Your Vitals Were     BP Pulse Temp Resp Height Weight    143/96 (BP Location: Right arm, Patient Position: Sitting) 80 97.6 °F (36.4 °C) (Oral) 81 5' 9" (1.753 m) 108.9 kg (240 lb)    SpO2 BMI             97% 35.44 kg/m2         Allergies as of 3/26/2017        Reactions    Shellfish Containing Products Anaphylaxis    crawfish    Sulfa (Sulfonamide Antibiotics) Anaphylaxis      Immunizations Administered on Date of Encounter - 3/26/2017     None      ED Micro, Lab, POCT     None      ED Imaging Orders     Start " Ordered       Status Ordering Provider    03/26/17 0022 03/26/17 0021  X-Ray Knee 3 View Left  1 time imaging      In process         Discharge Instructions         Knee Pain  Knee pain is very common. Its especially common in active people who put a lot of pressure on their knees, like runners. It affects women more often than men.  Your kneecap (patella) is a thick, round bone. It covers and protects the front portion of your knee joint. It moves along a groove in your thighbone (femur) as part of the patellofemoral joint. A layer of cartilage surrounds the underside of your kneecap. This layer protects it from grinding against your femur.  When this cartilage softens and breaks down, it can cause knee pain. This is partly because of repetitive stress. The stress irritates the lining of the joint. This causes pain in the underlying bone.  What causes knee pain?  Many things can cause knee pain. You may have more than one cause. Some of these include:  · Overuse of the knee joint  · The kneecap doesnt line up with the tissue around it  · Damage to small nerves in the area  · Damage to the ligament-like structure that holds the kneecap in place (retinaculum)  · Breakdown of the bone under the cartilage  · Swelling in the soft tissues around the kneecap  · Injury  You might be more likely to have knee pain if you:  · Exercise a lot  · Recently increased the intensity of your workouts  · Have a body mass index (BMI) greater than 25  · Have poor alignment of your kneecap  · Walk with your feet turned overly outward or inward  · Have weakness in surrounding muscle groups (inner quad or hip adductor muscles)  · Have too much tightness in surrounding muscle groups (hamstrings or iliotibial band)  · Have a recent history of injury to the area  · Are female  Symptoms of knee pain  This type of knee pain is a dull, aching pain in the front of the knee in the area under and around the kneecap. This pain may start quickly or  slowly. Your pain might be worse when you squat, run, or sit for a long time. You might also sometimes feel like your knee is giving out. You may have symptoms in one or both of your knees.  Diagnosing knee pain  Your health care provider will ask about your medical history and your symptoms. Be sure to describe any activities that make your knee pain worse. He or she will look at your knee. This will include tests of your range of motion, strength, and areas of pain of your knee. Your knee alignment will be checked.  Your health care provider will need to rule out other causes of your knee pain, such as arthritis. You may need an imaging test, such as an X-ray or MRI.  Treatment for knee pain  Treatments that can help ease your symptoms may include:  · Avoiding activities for a while that make your pain worse, returning to activity over time  · Icing the outside of your knee when it causes you pain  · Taking over-the-counter pain medicine  · Wearing a knee brace or taping your knee to support it  · Wearing special shoe inserts to help keep your feet in the proper alignment  · Doing special exercises to stretch and strengthen the muscles around your hip and your knee  These steps help most people manage knee pain. But some cases of knee pain need to be treated with surgery. You may need surgery right away. Or you may need it later if other treatments dont work. Your health care provider may refer you to an orthopedic surgeon. He or she will talk with you about your choices.  Preventing knee pain  Losing weight and correcting excess muscle tightness or muscle weakness may help lower your risk.  In some cases, you can prevent knee pain. To help prevent a flare-up of knee pain, you do these things:  · Regularly do all the exercises your doctor or physical therapist advises  · Support your knee as advised by your doctor or physical therapist  · Increase training gradually, and ease up on training when needed  · Have an  expert check your gait for running or other sporting activities  · Stretch properly before and after exercise  · Replace your running shoes regularly  · Lose excess weight     When to call your health care provider  Call your health care provider right away if:  · Your symptoms dont get better after a few weeks of treatment  · You have any new symptoms   Date Last Reviewed: 3/19/2015  © 8114-7961 [employer]. [address] [place] [address]. All rights reserved. This information is not intended as a substitute for professional medical care. Always follow your healthcare professional's instructions.          Your Scheduled Appointments     Mar 28, 2017  2:30 PM CDT   Established Physical Therapy with La Pickens, PT   [facility]    [address]  [place] [id number] [phone]            Mar 31, 2017  1:45 PM CDT   Established Physical Therapy with La Pickens, PT   [facility]    [address]  [place] [address]   [phone]            Apr 03, 2017 10:30 AM CDT   Established Physical Therapy with La Pickens, PT   [facility]    [address]  [place] [address]   [phone]            Apr 05, 2017 10:30 AM CDT   Established Physical Therapy with La Pickens, PT   [facility]    [address]  [place] [address]   [phone]            Apr 10, 2017 10:30 AM CDT   Established Physical Therapy with Naomi Newman, PTA   [facility]    [address]  [place] [address]   [phone]               [facility] complies with applicable Federal civil rights laws and does not discriminate on the basis of race, color, national origin, age, disability, or sex.        Language Assistance Services     ATTENTION: Language assistance services  are available, free of charge. Please call [phone].      ATENCIÓN: Si habla español, tiene a murillo disposición servicios gratuitos de asistencia lingüística. Llame al [phone].     CHÚ Ý: N?u b?n nói Ti?ng Vi?t, có các d?ch v? h? tr? ngôn ng? mi?n phí dành cho b?n. G?i s? [phone].

## 2017-03-26 PROCEDURE — 96372 THER/PROPH/DIAG INJ SC/IM: CPT

## 2017-03-26 PROCEDURE — 63600175 PHARM REV CODE 636 W HCPCS: Performed by: EMERGENCY MEDICINE

## 2017-03-26 RX ORDER — CYCLOBENZAPRINE HCL 10 MG
10 TABLET ORAL 3 TIMES DAILY PRN
Qty: 15 TABLET | Refills: 0 | Status: SHIPPED | OUTPATIENT
Start: 2017-03-26 | End: 2017-03-31

## 2017-03-26 RX ORDER — DICLOFENAC SODIUM 75 MG/1
75 TABLET, DELAYED RELEASE ORAL 2 TIMES DAILY
Qty: 60 TABLET | Refills: 0 | Status: SHIPPED | OUTPATIENT
Start: 2017-03-26 | End: 2017-04-12

## 2017-03-26 RX ORDER — ORPHENADRINE CITRATE 30 MG/ML
60 INJECTION INTRAMUSCULAR; INTRAVENOUS
Status: COMPLETED | OUTPATIENT
Start: 2017-03-26 | End: 2017-03-26

## 2017-03-26 RX ORDER — KETOROLAC TROMETHAMINE 30 MG/ML
15 INJECTION, SOLUTION INTRAMUSCULAR; INTRAVENOUS
Status: COMPLETED | OUTPATIENT
Start: 2017-03-26 | End: 2017-03-26

## 2017-03-26 RX ADMIN — ORPHENADRINE CITRATE 60 MG: 30 INJECTION INTRAMUSCULAR; INTRAVENOUS at 12:03

## 2017-03-26 RX ADMIN — KETOROLAC TROMETHAMINE 15 MG: 30 INJECTION, SOLUTION INTRAMUSCULAR at 12:03

## 2017-03-26 NOTE — ED TRIAGE NOTES
John Macedo, a 57 y.o. male presents to the ED with c/o pain to left knee and back. Pt states that knee has been swelling intermittently all day. Reports total knee replacement in May, and has had several issues and episodes of pain since surgery.  Pts wife states that the knee was very warm and swollen. Pt denies SOB, chest pain NVDF.       Chief Complaint   Patient presents with    Joint Swelling     left knee swelling that started today.     Back Pain     low back pain     Review of patient's allergies indicates:   Allergen Reactions    Shellfish containing products Anaphylaxis     crawfish    Sulfa (sulfonamide antibiotics) Anaphylaxis     Past Medical History:   Diagnosis Date    Arthritis     GERD (gastroesophageal reflux disease)      LOC: Patient name and date of birth verified.  The patient is awake, alert and aware of environment with an appropriate affect, the patient is oriented x 3 and speaking appropriately.  Pt in NAD.    APPEARANCE: Patient resting comfortably and in no acute distress, patient is clean and well groomed, patient's clothing is properly fastened.  SKIN: The skin is warm and dry, color consistent with ethnicity, patient has normal skin turgor and moist mucus membranes, skin intact, no breakdown or brusing noted. Incision scar from previous surgery present to left knee  MUSCULOSKELETAL: Pt reports decreased movement in left knee and leg. Increased swelling and warmth to left knee. Pt reports lower back pain.  RESPIRATORY: Airway is open and patent, respirations are spontaneous, patient has a normal effort and rate, no accessory muscle use noted.  CARDIAC: Patient has a normal rate and rhythm, no peripheral edema noted, capillary refill < 3 seconds.  ABDOMEN: Soft and non tender to palpation, no distention noted. Bowel sounds present in all four quadrants.  NEUROLOGIC: Eyes open spontaneously, behavior appropriate to situation, follows commands, facial expression symmetrical,  bilateral hand grasp equal and even, purposeful motor response noted, normal sensation in all extremities when touched with a finger.

## 2017-03-26 NOTE — ED PROVIDER NOTES
Encounter Date: 3/25/2017    SCRIBE #1 NOTE: I, Omkar Thayer, am scribing for, and in the presence of,  Dr. Harmon. I have scribed the entire note.       History     Chief Complaint   Patient presents with    Joint Swelling     left knee swelling that started today.     Back Pain     low back pain     Review of patient's allergies indicates:   Allergen Reactions    Shellfish containing products Anaphylaxis     crawfish    Sulfa (sulfonamide antibiotics) Anaphylaxis     HPI Comments: Time patient was seen by the provider: 12:17 AM      The patient is a 57 y.o. male with hx of: knee surgery(May 2016) that presents to the ED with a complaint of acute on chronic left knee pain and swelling, which began 1 week ago but worsened significantly today. Pt also complains of lower back pain. He denies any fall or trauma.   The history is provided by the patient.     Past Medical History:   Diagnosis Date    Arthritis     GERD (gastroesophageal reflux disease)      Past Surgical History:   Procedure Laterality Date    KNEE SURGERY Left 2009    partial knee      KNEE SURGERY Left 05/03/2016    TKR    WRIST SURGERY       History reviewed. No pertinent family history.  Social History   Substance Use Topics    Smoking status: Never Smoker    Smokeless tobacco: Never Used    Alcohol use Yes      Comment: occ     Review of Systems   Constitutional: Negative for fever.   HENT: Negative for sore throat.    Respiratory: Negative for shortness of breath.    Cardiovascular: Negative for chest pain.   Gastrointestinal: Negative for nausea.   Genitourinary: Negative for dysuria.   Musculoskeletal: Positive for back pain.        Left knee swelling and pain   Skin: Negative for rash.   Neurological: Negative for weakness.   Hematological: Does not bruise/bleed easily.     All systems were reviewed and were negative except as noted in the HPI.      Physical Exam   Initial Vitals   BP Pulse Resp Temp SpO2   03/25/17 2345 03/25/17 2345  03/25/17 2345 03/25/17 2345 03/25/17 2345   143/96 80 81 97.6 °F (36.4 °C) 97 %     Physical Exam    Nursing note and vitals reviewed.  Constitutional: No distress.   HENT:   Head: Normocephalic.   Mouth/Throat: Oropharynx is clear and moist.   Eyes: Conjunctivae are normal.   Neck: Neck supple.   Cardiovascular: Normal rate, regular rhythm and intact distal pulses.   Pulmonary/Chest: Breath sounds normal.   Abdominal: Soft. There is no tenderness.   Musculoskeletal:   No signs of significant swelling over the knee. No calf tenderness. Neurovascularly intact in the foot. Back is non tender in the midline. Mild paraspinous tenderness.    Skin: Skin is warm and dry.   Well healed scar over the knee. No overlying erythema or ecchymosis         ED Course   Procedures  Labs Reviewed - No data to display       X-Rays:   Independently Interpreted Readings:   Other Readings:  X-ray knee: No acute injury. Hardware intact    Medical Decision Making:   History:   Old Medical Records: I decided to obtain old medical records.  Independently Interpreted Test(s):   I have ordered and independently interpreted X-rays - see prior notes.  Clinical Tests:   Radiological Study: Ordered and Reviewed  ED Management:  Pt's x-ray shows no acute injury and his hardware is intact. He felt significant pain relief with the ED treatment. I do not suspect infection given his history and exam. He is stable for outpatient follow up. I sent a message to his orthopedic surgeon to help facilitate follow up            Scribe Attestation:   Scribe #1: I performed the above scribed service and the documentation accurately describes the services I performed. I attest to the accuracy of the note.    Attending Attestation:           Physician Attestation for Scribe:  Physician Attestation Statement for Scribe #1: I, Dr. Harmon, reviewed documentation, as scribed by Omkar Thayer in my presence, and it is both accurate and complete.                 ED Course      Clinical Impression:   The encounter diagnosis was Left knee pain.    Disposition:   Disposition: Discharged  Condition: Stable    Discharged to home in stable condition, return to ED warnings given, follow up and patient care instructions given.    José Miguel Harmon MD, CHADWICK, CPE, FACEP  Department of Emergency Medicine  , [employer]       John Harmon MD  03/26/17 0609

## 2017-03-27 ENCOUNTER — TELEPHONE (OUTPATIENT)
Dept: ORTHOPEDICS | Facility: CLINIC | Age: 57
End: 2017-03-27

## 2017-03-27 NOTE — TELEPHONE ENCOUNTER
Spoke to pt and he was given an appointment with  on 4/12/17 and the appointment slip was mailed to his home.

## 2017-03-27 NOTE — TELEPHONE ENCOUNTER
----- Message from Alvarez Garcia MD sent at 3/27/2017  9:01 AM CDT -----  A week or so after the EMG  ----- Message -----     From: Yarely Serrano MA     Sent: 3/27/2017   8:29 AM       To: Alvarez Garcia MD    Good morning, Pt is scheduled to have the EMG on 4/4/17 at [facility]. The pt doesn't have a follow up with you. Would you like me to schedule one?  ----- Message -----     From: Alvarez Garcia MD     Sent: 3/27/2017   7:48 AM       To: Yarely Serrano MA, Montserrat Bran RN    Please let me know when his next follow up is scheduled, and when he is supposed to get his EMG.  It was approved, but I don't see that it has been done.  GC  ----- Message -----     From: John Harmon MD     Sent: 3/26/2017   1:37 AM       To: Alvarez Garcia MD      Hi, can you arrange a fu for this pt?  Seen in ED with complaints of acute on chronic knee pain, benign exam, HW looked intact on xray, felt better after some toradol IM.    Thanks,    José Miguel Harmon MD, CHADWICK, CPE, FACEP  Department of Emergency Medicine  , [employer]

## 2017-04-03 ENCOUNTER — CLINICAL SUPPORT (OUTPATIENT)
Dept: REHABILITATION | Facility: HOSPITAL | Age: 57
End: 2017-04-03
Attending: ORTHOPAEDIC SURGERY
Payer: OTHER MISCELLANEOUS

## 2017-04-03 DIAGNOSIS — M54.40 CHRONIC RIGHT-SIDED LOW BACK PAIN WITH SCIATICA, SCIATICA LATERALITY UNSPECIFIED: Primary | ICD-10-CM

## 2017-04-03 DIAGNOSIS — G89.29 CHRONIC RIGHT-SIDED LOW BACK PAIN WITH SCIATICA, SCIATICA LATERALITY UNSPECIFIED: Primary | ICD-10-CM

## 2017-04-03 DIAGNOSIS — R53.1 DECREASED STRENGTH: ICD-10-CM

## 2017-04-03 DIAGNOSIS — Z74.09 IMPAIRED FUNCTIONAL MOBILITY, BALANCE, GAIT, AND ENDURANCE: ICD-10-CM

## 2017-04-03 DIAGNOSIS — Z78.9 IMPAIRED MOTOR CONTROL: ICD-10-CM

## 2017-04-03 DIAGNOSIS — M25.662 DECREASED ROM OF LEFT KNEE: ICD-10-CM

## 2017-04-03 PROCEDURE — 97110 THERAPEUTIC EXERCISES: CPT | Mod: PO | Performed by: PHYSICAL THERAPIST

## 2017-04-03 PROCEDURE — 97140 MANUAL THERAPY 1/> REGIONS: CPT | Mod: PO | Performed by: PHYSICAL THERAPIST

## 2017-04-03 NOTE — PLAN OF CARE
"                                                    Physical Therapy Progress Note     Name: John Macedo  Clinic Number: [id number]  Diagnosis:   Weakness of left lower extremity       S/P revision of total knee, left      Chronic pain of left knee      Difficulty walking        Physician: Alvarez Garcia MD  Treatment Orders: PT Eval and Treat, Bioness trial/ assessment  Past Medical History:   Diagnosis Date    Arthritis     GERD (gastroesophageal reflux disease)        Precautions: standard  Visit #: 15  Date of Eval: 1/27/2017  Plan of Care Expiration: 4/21/2017  Extended POC: 5/19/17        Subjective   Pt reports: "my leg is swelling a lot lately"    Pain Scale: LBP noted, 7/10, LBP after PT session- 5/10     Objective   Pt arrived without L300 plus today:     Patient received individual therapy to increase strength, endurance, ROM, flexibility and ambualtion with activities as follows:     Pt participated in therapeutic exercises x 32 minutes to improve motor control and strength to improve gait:    Pt supine on moist heat (20 min) while performing   SKTC 10 sec x 5   Pelvic tilt 5 sec 3 x 10   Bridging 3 x 10   LTR x 10 reps      3 x 10 reps of B LE SLR 3 #- NP today    Prone: 3 x 10 LLE  HS curls with #3 lb cuff weights    Left hip ext 3# 3 x 10   Alternate UE/LE lift 10x ea    Upright cycle x 8 minutes to improve left knee flexion.  Level 1     Pt participated in  Manual therapy x 10  Minutes  To address poor left knee ROM:   Sitting: GT to left quad with knee flexion- sweeping/ fanning, nudging with GT # 3 at joint line, proximal end of scar and quad tendon. GT #4 sweeping, fanning, and rocking of left HS with knee extension. Katz and glide and pin and stretch to lateral side of left quad with knee flex and ext.    Left knee ROM in sitting= 90 degrees--> 95 degrees     Written Home Exercises: 3/3/17- reviewed scar massage and self massage to decrease edema  2/9/17: issued wall slides to " improve knee flexion, prone HS curls, and prone hip hip extension    Education provided re: Educated pt on use of self L300 and how to use the remote control.  Proper way to don on/off    Pt has no cultural, educational or language barriers to learning provided.    Assessment   John tolerated treatment session well.  Pt is using [employer]ios L300 Plus on a regular basis for ~ 3.5 hrs daily. Pt continues to ambulate with an antalgic gait pattern which is likely increasing LBP. PT is addressing LBP with ROM and strengthening exercises. Pt reports he feels as though left knee motion feels better/ more free after [employer]. Significant gains in knee flexion ROM has not been noted. Pt did report decreased LBP at conclusion of treatment. Left knee ROM limits stair climbing.  Pt can benefit from continued skilled PT to address remaining impairments to improve safe mobility to return to PLOF. PT would like to continue to treat pt x 4 weeks for LBP and attempt to improve left knee flexion.     Pt prognosis is Good. Pt will continue to benefit from skilled outpatient physical therapy to address the deficits listed in the problem list, provide pt/family education and to maximize pt's level of independence in the home and community environment.     Anticipated barriers to physical therapy: PMHx, history of previous PT, anxiety about returning to PLOF    Medical necessity is demonstrated by the following IMPAIRMENTS/PROBLEM LIST:   Fall Risk - impaired balance   Weakness   Decreased ROM  Gait deviations   Decreased ambulation   Decreased activity tolerance   Difficulty participating in daily activities  Continued inability to participate fully in vocational pursuits   Requires skilled supervision to complete and progress HEP      Pt's spiritual, cultural and educational needs considered and pt agreeable to plan of care and GOALS as stated below:   Status as of 3/24/17:   Short term goals: 4-6 weeks, pt agrees to goals  set.  1. Pt will perform self ROM for knee flexion to improve mobility to correct gait impairments. Met- pt reports good complaince  2. Pt will demonstrate improved left knee flexion AAROM in sitting to 95 degrees to improve LE mobility for mobility (i.e. Sit<>stand, stairs). Met/ varies- ~90 degrees prior to treatment, ~100 degrees after treatment.   3. Pt will perform TUG with Bioness L300 Plus and Lofstrand crutch in 10 sec to demonstrate improved mobility and decreased fall risk. NT  4. Pt will demonstrate improved bilateral hip extension strength to 4/5 to improve balance and ambulation. Improved- left= 4-/5  5. Pt will report decreased left knee pain average to 5/10 with weight bearing to improve tolerance to mobility. Met previously  6. Assess ability to negotiate curbs and ramps and set goals as needed. Met- supervision-mod I with Bioness L300 Plus without AD      Long term goals: 8-12 weeks, pt agrees to goals set  7. 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 Pt will demonstrate improved ambulation velocity to 1.24 m/s to demonstrate a 25% improvement in speed/ mobility. Improved- 1.07 m/s without AD, using L300 plus  9. Pt will ambulate at least 300 ft with Bioness L300 Plus with or without Lofstrand to demonstrate improved ability to negotiate community distances. Met previously  10. Pt will demonstrate improved left knee strength to 4+/5 to improve all mobility, decrease risk of knee buckling, and decrease need for AD for ambulation. Improved- flex/ ext= 4/5  11. Pt will report decreased left knee pain average to 3/10 or less to demonstrate improved tolerance to all mobility improved- per pt average pain 4/10 left knee.      Plan   Continue PT 2-3x weekly under established Plan of Care, with treatment to include: pt education, HEP, therapeutic exercises, neuromuscular  re-education/balance exercises, therapeutic activities, NMES, joint mobilizations PRN, manual therapy PRN, and modalities PRN, to work towards established goals. Pt may be seen by PTA to carry out plan of care.     La Pickens, PT   04/03/2017    I certify the need for these services furnished under this plan of treatment and while under my care.  ____________________________________ Physician/Referring Practitioner   Date of Signature

## 2017-04-03 NOTE — PROGRESS NOTES
"                                                    Physical Therapy Progress Note     Name: John Macedo  Clinic Number: [id number]  Diagnosis:   Weakness of left lower extremity       S/P revision of total knee, left      Chronic pain of left knee      Difficulty walking        Physician: Alvarez Garcia MD  Treatment Orders: PT Eval and Treat, Bioness trial/ assessment  Past Medical History:   Diagnosis Date    Arthritis     GERD (gastroesophageal reflux disease)        Precautions: standard  Visit #: 15  Date of Eval: 1/27/2017  Plan of Care Expiration: 4/21/2017  Extended POC: 5/19/17        Subjective   Pt reports: "my leg is swelling a lot lately"    Pain Scale: LBP noted, 7/10, LBP after PT session- 5/10     Objective   Pt arrived without L300 plus today:     Patient received individual therapy to increase strength, endurance, ROM, flexibility and ambualtion with activities as follows:     Pt participated in therapeutic exercises x 32 minutes to improve motor control and strength to improve gait:    Pt supine on moist heat (20 min) while performing   SKTC 10 sec x 5   Pelvic tilt 5 sec 3 x 10   Bridging 3 x 10   LTR x 10 reps      3 x 10 reps of B LE SLR 3 #- NP today    Prone: 3 x 10 LLE  HS curls with #3 lb cuff weights    Left hip ext 3# 3 x 10   Alternate UE/LE lift 10x ea    Upright cycle x 8 minutes to improve left knee flexion.  Level 1     Pt participated in  Manual therapy x 10  Minutes  To address poor left knee ROM:   Sitting: GT to left quad with knee flexion- sweeping/ fanning, nudging with GT # 3 at joint line, proximal end of scar and quad tendon. GT #4 sweeping, fanning, and rocking of left HS with knee extension. Katz and glide and pin and stretch to lateral side of left quad with knee flex and ext.    Left knee ROM in sitting= 90 degrees--> 95 degrees     Written Home Exercises: 3/3/17- reviewed scar massage and self massage to decrease edema  2/9/17: issued wall slides to " improve knee flexion, prone HS curls, and prone hip hip extension    Education provided re: Educated pt on use of self L300 and how to use the remote control.  Proper way to don on/off    Pt has no cultural, educational or language barriers to learning provided.    Assessment   John tolerated treatment session well.  Pt is using [employer] L300 Plus on a regular basis for ~ 3.5 hrs daily. Pt continues to ambulate with an antalgic gait pattern which is likely increasing LBP. PT is addressing LBP with ROM and strengthening exercises. Pt reports he feels as though left knee motion feels better/ more free after [employer]. Significant gains in knee flexion ROM has not been noted. Pt did report decreased LBP at conclusion of treatment. Left knee ROM limits stair climbing.  Pt can benefit from continued skilled PT to address remaining impairments to improve safe mobility to return to PLOF. PT would like to continue to treat pt x 4 weeks for LBP and attempt to improve left knee flexion.     Pt prognosis is Good. Pt will continue to benefit from skilled outpatient physical therapy to address the deficits listed in the problem list, provide pt/family education and to maximize pt's level of independence in the home and community environment.     Anticipated barriers to physical therapy: PMHx, history of previous PT, anxiety about returning to PLOF    Medical necessity is demonstrated by the following IMPAIRMENTS/PROBLEM LIST:   Fall Risk - impaired balance   Weakness   Decreased ROM  Gait deviations   Decreased ambulation   Decreased activity tolerance   Difficulty participating in daily activities  Continued inability to participate fully in vocational pursuits   Requires skilled supervision to complete and progress HEP      Pt's spiritual, cultural and educational needs considered and pt agreeable to plan of care and GOALS as stated below:   Status as of 3/24/17:   Short term goals: 4-6 weeks, pt agrees to goals  set.  1. Pt will perform self ROM for knee flexion to improve mobility to correct gait impairments. Met- pt reports good complaince  2. Pt will demonstrate improved left knee flexion AAROM in sitting to 95 degrees to improve LE mobility for mobility (i.e. Sit<>stand, stairs). Met/ varies- ~90 degrees prior to treatment, ~100 degrees after treatment.   3. Pt will perform TUG with Bioness L300 Plus and Lofstrand crutch in 10 sec to demonstrate improved mobility and decreased fall risk. NT  4. Pt will demonstrate improved bilateral hip extension strength to 4/5 to improve balance and ambulation. Improved- left= 4-/5  5. Pt will report decreased left knee pain average to 5/10 with weight bearing to improve tolerance to mobility. Met previously  6. Assess ability to negotiate curbs and ramps and set goals as needed. Met- supervision-mod I with Bioness L300 Plus without AD      Long term goals: 8-12 weeks, pt agrees to goals set  7. 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 Pt will demonstrate improved ambulation velocity to 1.24 m/s to demonstrate a 25% improvement in speed/ mobility. Improved- 1.07 m/s without AD, using L300 plus  9. Pt will ambulate at least 300 ft with Bioness L300 Plus with or without Lofstrand to demonstrate improved ability to negotiate community distances. Met previously  10. Pt will demonstrate improved left knee strength to 4+/5 to improve all mobility, decrease risk of knee buckling, and decrease need for AD for ambulation. Improved- flex/ ext= 4/5  11. Pt will report decreased left knee pain average to 3/10 or less to demonstrate improved tolerance to all mobility improved- per pt average pain 4/10 left knee.      Plan   Continue PT 2-3x weekly under established Plan of Care, with treatment to include: pt education, HEP, therapeutic exercises, neuromuscular  re-education/balance exercises, therapeutic activities, NMES, joint mobilizations PRN, manual therapy PRN, and modalities PRN, to work towards established goals. Pt may be seen by PTA to carry out plan of care.     La Pickens, PT   04/03/2017

## 2017-04-05 ENCOUNTER — CLINICAL SUPPORT (OUTPATIENT)
Dept: REHABILITATION | Facility: HOSPITAL | Age: 57
End: 2017-04-05
Attending: ORTHOPAEDIC SURGERY
Payer: OTHER MISCELLANEOUS

## 2017-04-05 DIAGNOSIS — Z78.9 IMPAIRED MOTOR CONTROL: ICD-10-CM

## 2017-04-05 DIAGNOSIS — M25.662 DECREASED ROM OF LEFT KNEE: ICD-10-CM

## 2017-04-05 DIAGNOSIS — R53.1 DECREASED STRENGTH: ICD-10-CM

## 2017-04-05 DIAGNOSIS — Z74.09 IMPAIRED FUNCTIONAL MOBILITY, BALANCE, GAIT, AND ENDURANCE: ICD-10-CM

## 2017-04-05 PROCEDURE — 97140 MANUAL THERAPY 1/> REGIONS: CPT | Mod: PO | Performed by: PHYSICAL THERAPIST

## 2017-04-05 PROCEDURE — 97110 THERAPEUTIC EXERCISES: CPT | Mod: PO | Performed by: PHYSICAL THERAPIST

## 2017-04-05 NOTE — PROGRESS NOTES
"                                                    Physical Therapy Progress Note     Name: John Macedo  Clinic Number: [id number]  Diagnosis:   Weakness of left lower extremity       S/P revision of total knee, left      Chronic pain of left knee      Difficulty walking        Physician: Alvarez Garcia MD  Treatment Orders: PT Eval and Treat, Bioness trial/ assessment  Past Medical History:   Diagnosis Date    Arthritis     GERD (gastroesophageal reflux disease)        Precautions: standard  Visit #: 16  Date of Eval: 1/27/2017  Plan of Care Expiration: 4/21/2017  Extended POC: 5/19/17        Subjective   Pt reports: "my leg got swollen at the theatre"    Pain Scale: LBP noted, 5/10    Objective   Pt arrived without L300 plus today, but had Lofstrand:     Patient received individual therapy to increase strength, endurance, ROM, flexibility and ambualtion with activities as follows:     Pt participated in therapeutic exercises x 13 minutes to improve motor control and strength to improve gait:      Upright cycle x 8 minutes to improve left knee flexion.  Level 1 (seat 3)    Prone: MHP to lumbar with GT   Left HS curls 3# 3 x 10    NP today  Pt supine on moist heat (20 min) while performing   SKTC 10 sec x 5   Pelvic tilt 5 sec 3 x 10   Bridging 3 x 10   LTR x 10 reps    3 x 10 reps of B LE SLR 3 #  Prone: 3 x 10 LLE  HS curls with #3 lb cuff weights    Left hip ext 3# 3 x 10   Alternate UE/LE lift 10x ea       Pt participated in  Manual therapy x 32  Minutes  To address poor left knee ROM:   Prone LLE: sweeping and fanning of HS and hip adductors GT #4, nudging at all  muscular perimeters with GT #3 (resistance noted at semimembranosus)-  performed with knee ext and flex  Sitting: GT to left quad with knee flexion- sweeping/ fanning, nudging with GT # 3 at joint  line, proximal end of scar and quad tendon. GT #4 sweeping, fanning. Pin and  glide and pin and stretch to lateral side of left quad with " knee flex and ext.    Left knee ROM in sitting= 95 degrees--> 102 degrees, firm to hard endfeel     Written Home Exercises: 3/3/17- reviewed scar massage and self massage to decrease edema  2/9/17: issued wall slides to improve knee flexion, prone HS curls, and prone hip hip extension    Education provided re: Educated pt on use of self L300 and how to use the remote control.  Proper way to don on/off    Pt has no cultural, educational or language barriers to learning provided.    Assessment   John tolerated treatment session well. Pt requested increased concentration on improving knee mobility as he can practice most of his strengthening activities at home. GT was performing in prone and sitting to quads, HS, IT band and hip adductors to address. Pt was noted to have adhesion/ binding noted ~ right semimembranosus proximally. pt reported improved ease of motion and slightly gained flexion (+7 degrees) with treatment today. Pt was also able to tolerate riding stationary bike while being positioned closer to the pedals to improve knee flexion.  Pt can benefit from continued skilled PT to address remaining impairments to improve safe mobility to return to PLOF. PT would like to continue to treat pt x 4 weeks for LBP and attempt to improve left knee flexion.     Pt prognosis is Good. Pt will continue to benefit from skilled outpatient physical therapy to address the deficits listed in the problem list, provide pt/family education and to maximize pt's level of independence in the home and community environment.     Anticipated barriers to physical therapy: PMHx, history of previous PT, anxiety about returning to PLOF    Medical necessity is demonstrated by the following IMPAIRMENTS/PROBLEM LIST:   Fall Risk - impaired balance   Weakness   Decreased ROM  Gait deviations   Decreased ambulation   Decreased activity tolerance   Difficulty participating in daily activities  Continued inability to participate fully in  vocational pursuits   Requires skilled supervision to complete and progress HEP      Pt's spiritual, cultural and educational needs considered and pt agreeable to plan of care and GOALS as stated below:   Status as of 3/24/17:   Short term goals: 4-6 weeks, pt agrees to goals set.  1. Pt will perform self ROM for knee flexion to improve mobility to correct gait impairments. Met- pt reports good complaince  2. Pt will demonstrate improved left knee flexion AAROM in sitting to 95 degrees to improve LE mobility for mobility (i.e. Sit<>stand, stairs). Met/ varies- ~90 degrees prior to treatment, ~100 degrees after treatment.   3. Pt will perform TUG with Bioness L300 Plus and Lofstrand crutch in 10 sec to demonstrate improved mobility and decreased fall risk. NT  4. Pt will demonstrate improved bilateral hip extension strength to 4/5 to improve balance and ambulation. Improved- left= 4-/5  5. Pt will report decreased left knee pain average to 5/10 with weight bearing to improve tolerance to mobility. Met previously  6. Assess ability to negotiate curbs and ramps and set goals as needed. Met- supervision-mod I with Bioness L300 Plus without AD      Long term goals: 8-12 weeks, pt agrees to goals set  7. 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 Pt will demonstrate improved ambulation velocity to 1.24 m/s to demonstrate a 25% improvement in speed/ mobility. Improved- 1.07 m/s without AD, using L300 plus  9. Pt will ambulate at least 300 ft with Bioness L300 Plus with or without Lofstrand to demonstrate improved ability to negotiate community distances. Met previously  10. Pt will demonstrate improved left knee strength to 4+/5 to improve all mobility, decrease risk of knee buckling, and decrease need for AD for ambulation. Improved- flex/ ext= 4/5  11. Pt will report decreased left knee pain  average to 3/10 or less to demonstrate improved tolerance to all mobility improved- per pt average pain 4/10 left knee.      Plan   Continue PT 2-3x weekly under established Plan of Care, with treatment to include: pt education, HEP, therapeutic exercises, neuromuscular re-education/balance exercises, therapeutic activities, NMES, joint mobilizations PRN, manual therapy PRN, and modalities PRN, to work towards established goals. Pt may be seen by PTA to carry out plan of care.     La Pickens, PT   04/05/2017

## 2017-04-10 ENCOUNTER — CLINICAL SUPPORT (OUTPATIENT)
Dept: REHABILITATION | Facility: HOSPITAL | Age: 57
End: 2017-04-10
Attending: ORTHOPAEDIC SURGERY
Payer: OTHER MISCELLANEOUS

## 2017-04-10 DIAGNOSIS — Z74.09 IMPAIRED FUNCTIONAL MOBILITY, BALANCE, GAIT, AND ENDURANCE: Chronic | ICD-10-CM

## 2017-04-10 DIAGNOSIS — M54.40 CHRONIC RIGHT-SIDED LOW BACK PAIN WITH SCIATICA, SCIATICA LATERALITY UNSPECIFIED: ICD-10-CM

## 2017-04-10 DIAGNOSIS — M25.562 CHRONIC PAIN OF LEFT KNEE: ICD-10-CM

## 2017-04-10 DIAGNOSIS — G89.29 CHRONIC RIGHT-SIDED LOW BACK PAIN WITH SCIATICA, SCIATICA LATERALITY UNSPECIFIED: ICD-10-CM

## 2017-04-10 DIAGNOSIS — M25.662 DECREASED ROM OF LEFT KNEE: Chronic | ICD-10-CM

## 2017-04-10 DIAGNOSIS — G89.29 CHRONIC PAIN OF LEFT KNEE: ICD-10-CM

## 2017-04-10 DIAGNOSIS — Z78.9 IMPAIRED MOTOR CONTROL: Chronic | ICD-10-CM

## 2017-04-10 DIAGNOSIS — R53.1 DECREASED STRENGTH: Primary | Chronic | ICD-10-CM

## 2017-04-10 PROCEDURE — 97140 MANUAL THERAPY 1/> REGIONS: CPT | Mod: PO

## 2017-04-10 PROCEDURE — 97110 THERAPEUTIC EXERCISES: CPT | Mod: PO

## 2017-04-10 NOTE — PROGRESS NOTES
"                                                    Physical Therapy Progress Note     Name: John Macedo  Clinic Number: [id number]  Diagnosis:   Weakness of left lower extremity       S/P revision of total knee, left      Chronic pain of left knee      Difficulty walking        Physician: Alvarez Garcia MD  Treatment Orders: PT Eval and Treat, Bioness trial/ assessment  Past Medical History:   Diagnosis Date    Arthritis     GERD (gastroesophageal reflux disease)        Precautions: standard  Visit #: 17  Date of Eval: 1/27/2017  Plan of Care Expiration: 4/21/2017  Extended POC: 5/19/17        Subjective   Pt reports: "I over did it this weekend around the house and it did me in."    Pain Scale: LBP noted, 8/10.  L knee pain 6    Objective   Pt arrived without L300 plus today, but had Lofstrand:     Patient received individual therapy to increase strength, endurance, ROM, flexibility and ambualtion with activities as follows:     Pt participated in therapeutic exercises x 13 minutes to improve motor control and strength to improve gait:      Upright cycle x 8 minutes to improve left knee flexion.  Level 1 (seat 6)    Prone: MHP to lumbar with tool assisted massage    Left HS curls 3# 3 x 10       Pt participated in  Manual therapy x 32  Minutes  To address poor left knee ROM: with concave surface  Prone LLE: sweeping and fanning of HS and hip adductors, nudging at all muscular perimeters  (resistance noted at semimembranosus)-  performed with knee ext and flex  Sitting: Tool assisted  to left quad with knee flexion- sweeping/ fanning, nudging  at joint line, proximal end of scar and quad tendon. sweeping, fanning. Pin and glide and pin and stretch to lateral side of left quad with knee flex and ext.    Left knee ROM in sitting= 93 degrees--> 100 degrees, firm to hard endfeel     Written Home Exercises: 3/3/17- reviewed scar massage and self massage to decrease edema  2/9/17: issued wall slides to " improve knee flexion, prone HS curls, and prone hip hip extension    Education provided re: Educated pt on use of self L300 and how to use the remote control.  Proper way to don on/off    Pt has no cultural, educational or language barriers to learning provided.    Assessment   John tolerated treatment session well and did not have any problems.  Cont to focus on stretching, strengthing and manual exercises to increase L knee ROM.  Noted some increased knee flexion following manual massage technique.  Cont with POC.      Pt prognosis is Good. Pt will continue to benefit from skilled outpatient physical therapy to address the deficits listed in the problem list, provide pt/family education and to maximize pt's level of independence in the home and community environment.     Anticipated barriers to physical therapy: PMHx, history of previous PT, anxiety about returning to [facility]    Medical necessity is demonstrated by the following IMPAIRMENTS/PROBLEM LIST:   Fall Risk - impaired balance   Weakness   Decreased ROM  Gait deviations   Decreased ambulation   Decreased activity tolerance   Difficulty participating in daily activities  Continued inability to participate fully in vocational pursuits   Requires skilled supervision to complete and progress HEP      Pt's spiritual, cultural and educational needs considered and pt agreeable to plan of care and GOALS as stated below:   Status as of 3/24/17:   Short term goals: 4-6 weeks, pt agrees to goals set.  1. Pt will perform self ROM for knee flexion to improve mobility to correct gait impairments. Met- pt reports good complaince  2. Pt will demonstrate improved left knee flexion AAROM in sitting to 95 degrees to improve LE mobility for mobility (i.e. Sit<>stand, stairs). Met/ varies- ~90 degrees prior to treatment, ~100 degrees after treatment.   3. Pt will perform TUG with Tactile L300 Plus and Lofstrand crutch in 10 sec to demonstrate improved mobility and decreased  fall risk. NT  4. Pt will demonstrate improved bilateral hip extension strength to 4/5 to improve balance and ambulation. Improved- left= 4-/5  5. Pt will report decreased left knee pain average to 5/10 with weight bearing to improve tolerance to mobility. Met previously  6. Assess ability to negotiate curbs and ramps and set goals as needed. Met- supervision-mod I with Bioness L300 Plus without AD      Long term goals: 8-12 weeks, pt agrees to goals set  7. 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 Pt will demonstrate improved ambulation velocity to 1.24 m/s to demonstrate a 25% improvement in speed/ mobility. Improved- 1.07 m/s without AD, using L300 plus  9. Pt will ambulate at least 300 ft with Bioness L300 Plus with or without Lofstrand to demonstrate improved ability to negotiate community distances. Met previously  10. Pt will demonstrate improved left knee strength to 4+/5 to improve all mobility, decrease risk of knee buckling, and decrease need for AD for ambulation. Improved- flex/ ext= 4/5  11. Pt will report decreased left knee pain average to 3/10 or less to demonstrate improved tolerance to all mobility improved- per pt average pain 4/10 left knee.      Plan   Continue PT 2-3x weekly under established Plan of Care, with treatment to include: pt education, HEP, therapeutic exercises, neuromuscular re-education/balance exercises, therapeutic activities, NMES, joint mobilizations PRN, manual therapy PRN, and modalities PRN, to work towards established goals. Pt may be seen by PTA to carry out plan of care.     Naomi Newman, PTA   04/10/2017

## 2017-04-12 ENCOUNTER — CLINICAL SUPPORT (OUTPATIENT)
Dept: REHABILITATION | Facility: HOSPITAL | Age: 57
End: 2017-04-12
Attending: ORTHOPAEDIC SURGERY
Payer: OTHER MISCELLANEOUS

## 2017-04-12 ENCOUNTER — OFFICE VISIT (OUTPATIENT)
Dept: ORTHOPEDICS | Facility: CLINIC | Age: 57
End: 2017-04-12
Payer: OTHER MISCELLANEOUS

## 2017-04-12 VITALS — WEIGHT: 237.13 LBS | HEIGHT: 69 IN | BODY MASS INDEX: 35.12 KG/M2

## 2017-04-12 DIAGNOSIS — G89.29 CHRONIC PAIN OF LEFT KNEE: ICD-10-CM

## 2017-04-12 DIAGNOSIS — Z78.9 IMPAIRED MOTOR CONTROL: ICD-10-CM

## 2017-04-12 DIAGNOSIS — R53.1 DECREASED STRENGTH: ICD-10-CM

## 2017-04-12 DIAGNOSIS — Z96.652 S/P REVISION OF TOTAL KNEE, LEFT: Primary | ICD-10-CM

## 2017-04-12 DIAGNOSIS — M25.662 DECREASED ROM OF LEFT KNEE: ICD-10-CM

## 2017-04-12 DIAGNOSIS — R29.898 WEAKNESS OF LEFT LOWER EXTREMITY: ICD-10-CM

## 2017-04-12 DIAGNOSIS — R26.2 DIFFICULTY WALKING: ICD-10-CM

## 2017-04-12 DIAGNOSIS — Z74.09 IMPAIRED FUNCTIONAL MOBILITY, BALANCE, GAIT, AND ENDURANCE: ICD-10-CM

## 2017-04-12 DIAGNOSIS — M25.562 CHRONIC PAIN OF LEFT KNEE: ICD-10-CM

## 2017-04-12 PROCEDURE — 97110 THERAPEUTIC EXERCISES: CPT | Mod: PO | Performed by: PHYSICAL THERAPIST

## 2017-04-12 PROCEDURE — 99213 OFFICE O/P EST LOW 20 MIN: CPT | Mod: S$GLB,,, | Performed by: ORTHOPAEDIC SURGERY

## 2017-04-12 PROCEDURE — 99999 PR PBB SHADOW E&M-EST. PATIENT-LVL II: CPT | Mod: PBBFAC,,, | Performed by: ORTHOPAEDIC SURGERY

## 2017-04-12 PROCEDURE — 97140 MANUAL THERAPY 1/> REGIONS: CPT | Mod: PO | Performed by: PHYSICAL THERAPIST

## 2017-04-12 NOTE — PROGRESS NOTES
Subjective:      Patient ID: John Macedo is a 57 y.o. male.    Chief Complaint: Pain of the Left Knee    HPI  John Macedo has left knee pain.  The pain is unchanged. He has been having swelling in both legs and also more in the left knee at the end of the day.  He showed me some pictures of his knee and leg with swelling at the eng of the day.The pain is located in the anterior aspect of the knee.  There is radiation.  The pain radaites to the anterior thigh.  There is associated stiffness.   There is not catching and locking. The knee does give way on occasion. The pain is described as achy. The pain is aggravated by standing and walking.  It is alleviated by rest.  There is associated back pain.  His history, medications and problem list were reviewed. He is still in PT.  I reviewed the notes.  I reviewed his EMG.  There is no sign of femoral muscle axonal denervation.  There was a femoral motor response in the new study.    Review of Systems   Constitution: Negative for chills, fever and night sweats.   HENT: Negative for headaches and hearing loss.    Eyes: Negative for blurred vision and double vision.   Cardiovascular: Negative for chest pain, claudication and leg swelling.   Respiratory: Negative for shortness of breath.    Endocrine: Negative for polydipsia, polyphagia and polyuria.   Hematologic/Lymphatic: Negative for adenopathy and bleeding problem. Does not bruise/bleed easily.   Skin: Negative for poor wound healing.   Musculoskeletal: Positive for joint pain.   Gastrointestinal: Negative for diarrhea and heartburn.   Genitourinary: Negative for bladder incontinence.   Neurological: Negative for focal weakness, numbness, paresthesias and sensory change.   Psychiatric/Behavioral: The patient is not nervous/anxious.    Allergic/Immunologic: Negative for persistent infections.         Objective:      Body mass index is 35.01 kg/(m^2).  Vitals:    04/12/17 1431   Weight: 107.6 kg (237 lb 1.7 oz)  "  Height: 5' 9" (1.753 m)           General    Constitutional: He is oriented to person, place, and time. He appears well-developed and well-nourished.   HENT:   Head: Normocephalic and atraumatic.   Eyes: EOM are normal.   Cardiovascular: Normal rate.    Pulmonary/Chest: Effort normal.   Neurological: He is alert and oriented to person, place, and time.   Psychiatric: He has a normal mood and affect. His behavior is normal.     General Musculoskeletal Exam   Gait: normal       Right Knee Exam     Inspection   Erythema: absent  Scars: absent  Swelling: absent (circumference at superor pole of patella 41.5 cm)  Effusion: effusion  Deformity: deformity  Bruising: absent    Tenderness   The patient is experiencing no tenderness.         Range of Motion   Extension: 0   Flexion: 130     Tests   Ligament Examination Lachman: normal (-1 to 2mm)   MCL - Valgus: normal (0 to 2mm)  LCL - Varus: normal  Patella   Passive Patellar Tilt: neutral    Other   Sensation: normal    Left Knee Exam     Inspection   Erythema: absent  Scars: present  Swelling: present (circimference at superior pole of patella 44 cm)  Effusion: absent  Deformity: deformity  Bruising: absent    Tenderness   The patient tender to palpation of the no tenderness and lateral retinaculum.    Range of Motion   Extension: 0   Flexion: 100     Tests   Stability Lachman: normal (-1 to 2mm)   MCL - Valgus: normal (0 to 2mm)  LCL - Varus: normal (0 to 2mm)  Patella   Passive Patellar Tilt: neutral    Other   Sensation: normal    Muscle Strength   Right Lower Extremity   Hip Abduction: 5/5   Quadriceps:  5/5   Hamstrin/5   Left Lower Extremity   Hip Abduction: 5/5   Quadriceps:  5/5   Hamstrin/5     Reflexes     Left Side  Quadriceps:  2+    Right Side   Quadriceps:  2+    Vascular Exam     Right Pulses  Dorsalis Pedis:      2+          Left Pulses  Dorsalis Pedis:      2+          Edema  Right Lower Leg: present (1+)  Left Lower Leg: present " (1+)    Radiographs taken 3/26 were reviewed by me.  There is a prosthetic replacement of the left knee(s).  The prosthesis is well positioned.  There is not evidence of bone loss, osteolysis, or loosening. There is not a fracture.                Assessment:       Encounter Diagnoses   Name Primary?    S/P revision of total knee, left Yes    Weakness of left lower extremity     Chronic pain of left knee     Difficulty walking           Plan:       John was seen today for pain.    Diagnoses and all orders for this visit:    S/P revision of total knee, left    Weakness of left lower extremity    Chronic pain of left knee    Difficulty walking      He is inquiring about disability.  I would like to get an FCE at this point.  He is not at [facility] yet.

## 2017-04-12 NOTE — PROGRESS NOTES
Physical Therapy Progress Note     Name: John Macedo  Clinic Number: [id number]  Diagnosis:   Weakness of left lower extremity       S/P revision of total knee, left      Chronic pain of left knee      Difficulty walking        Physician: Alvarez Garcia MD  Treatment Orders: PT Eval and Treat, Bioness trial/ assessment  Past Medical History:   Diagnosis Date    Arthritis     GERD (gastroesophageal reflux disease)        Precautions: standard  Visit #: 18  Date of Eval: 1/27/2017  Plan of Care Expiration: 4/21/2017  Extended POC: 5/19/17        Subjective   Pt reports: fell yesterday with walking stick- left knee gave out    Pain Scale: LBP noted, 7/10.  L knee pain 7/10    Objective   Pt arrived without L300 plus today, but had Lofstrand:     Patient received individual therapy to increase strength, endurance, ROM, flexibility and ambualtion with activities as follows:     Pt participated in therapeutic exercises x 32 minutes to improve motor control and strength to improve gait:      Upright cycle x 10 minutes to improve left knee flexion.  Level 1 (seat 3)  PROM: HSS with neural glide, piriformis stretch, assisted single knee to chest    MPH x 18 minutes to lumbar region with prone and supine exercises:   Pelvic tilts 2 x 10  LTR 10x  Prone alternate UE/LE lift 20x  Bridging 10x    NP today: Left HS curls 3# 3 x 10       Pt participated in  Manual therapy x 12  Minutes  To address poor left knee ROM: with concave surface  Prone LLE: sweeping and fanning of HS, nudging at all muscular perimeters-  performed with PROM knee ext and flex  Contract relax in prone for left knee flexion- 5x    Left knee ROM in sitting= 93 degrees--> 103 degrees, firm to hard endfeel     Written Home Exercises: 3/3/17- reviewed scar massage and self massage to decrease edema  2/9/17: issued wall slides to improve knee flexion, prone HS curls, and prone hip hip  "extension    Education provided re: Educated pt on use of self L300 and how to use the remote control.  Proper way to don on/off    Pt has no cultural, educational or language barriers to learning provided.    Assessment   John tolerated treatment session well.  Pt reports left knee collapsed yesterday without notice resulting in a fall. Pt reports wearing Bioness L300 Plus for at least 3 hrs daily, using device for ambulation and strengthening. Pt describes back pain as muscular ("grabs on right side then goes upward on both sides). Pt demonstrates a slight improvement in left knee flexion. ROM is still not functional for climbing stairs or ideal for sit<>stand. Pt instructed to [employer] performing core stabilization exercises to address LBP. Pt can benefit from continued skilled PT to address impairments to improve consistent, independent mobility.     Pt prognosis is Good. Pt will continue to benefit from skilled outpatient physical therapy to address the deficits listed in the problem list, provide pt/family education and to maximize pt's level of independence in the home and community environment.     Anticipated barriers to physical therapy: PMHx, history of previous PT, anxiety about returning to [facility]    Medical necessity is demonstrated by the following IMPAIRMENTS/PROBLEM LIST:   Fall Risk - impaired balance   Weakness   Decreased ROM  Gait deviations   Decreased ambulation   Decreased activity tolerance   Difficulty participating in daily activities  Continued inability to participate fully in vocational pursuits   Requires skilled supervision to complete and progress HEP      Pt's spiritual, cultural and educational needs considered and pt agreeable to plan of care and GOALS as stated below:   Status as of 3/24/17:   Short term goals: 4-6 weeks, pt agrees to goals set.  1. Pt will perform self ROM for knee flexion to improve mobility to correct gait impairments. Met- pt reports good complaince  2. Pt " will demonstrate improved left knee flexion AAROM in sitting to 95 degrees to improve LE mobility for mobility (i.e. Sit<>stand, stairs). Met/ varies- ~90 degrees prior to treatment, ~100 degrees after treatment.   3. Pt will perform TUG with Bioness L300 Plus and Lofstrand crutch in 10 sec to demonstrate improved mobility and decreased fall risk. NT  4. Pt will demonstrate improved bilateral hip extension strength to 4/5 to improve balance and ambulation. Improved- left= 4-/5  5. Pt will report decreased left knee pain average to 5/10 with weight bearing to improve tolerance to mobility. Met previously  6. Assess ability to negotiate curbs and ramps and set goals as needed. Met- supervision-mod I with Bioness L300 Plus without AD      Long term goals: 8-12 weeks, pt agrees to goals set  7. 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 Pt will demonstrate improved ambulation velocity to 1.24 m/s to demonstrate a 25% improvement in speed/ mobility. Improved- 1.07 m/s without AD, using L300 plus  9. Pt will ambulate at least 300 ft with Bioness L300 Plus with or without Lofstrand to demonstrate improved ability to negotiate community distances. Met previously  10. Pt will demonstrate improved left knee strength to 4+/5 to improve all mobility, decrease risk of knee buckling, and decrease need for AD for ambulation. Improved- flex/ ext= 4/5  11. Pt will report decreased left knee pain average to 3/10 or less to demonstrate improved tolerance to all mobility improved- per pt average pain 4/10 left knee.      Plan   Continue PT 2-3x weekly under established Plan of Care, with treatment to include: pt education, HEP, therapeutic exercises, neuromuscular re-education/balance exercises, therapeutic activities, NMES, joint mobilizations PRN, manual therapy PRN, and modalities PRN, to work towards  established goals. Pt may be seen by PTA to carry out plan of care.     La Pickens, PT   04/12/2017

## 2017-04-17 ENCOUNTER — PATIENT MESSAGE (OUTPATIENT)
Dept: ORTHOPEDICS | Facility: CLINIC | Age: 57
End: 2017-04-17

## 2017-04-18 ENCOUNTER — PATIENT MESSAGE (OUTPATIENT)
Dept: ORTHOPEDICS | Facility: CLINIC | Age: 57
End: 2017-04-18

## 2017-04-18 ENCOUNTER — DOCUMENTATION ONLY (OUTPATIENT)
Dept: ORTHOPEDICS | Facility: CLINIC | Age: 57
End: 2017-04-18

## 2017-04-18 NOTE — PROGRESS NOTES
----- Message -----           From: Umm Butcher          Sent: 4/17/2017   3:34 PM            To: La Pickens PT, [facility] Staff       Subject: Medical Reviewer info                                      The medical reviewer that is assigned to this case is Dr. Barfield. He can be reached at [phone]. I did received the message that Dr. Garcia is out of the office until tomorrow. I just wanted to share this information. For any additional information, please contact me at [phone].              Thanks,              Katherin Butcher       [employer]      I called today as instructed and was told the case was closed. I will have to wait to get writen instructions on how to appeal.  I spoke with Dr. Barfield.

## 2017-05-01 ENCOUNTER — PATIENT MESSAGE (OUTPATIENT)
Dept: ORTHOPEDICS | Facility: CLINIC | Age: 57
End: 2017-05-01

## 2017-05-04 ENCOUNTER — PATIENT MESSAGE (OUTPATIENT)
Dept: ORTHOPEDICS | Facility: CLINIC | Age: 57
End: 2017-05-04

## 2017-05-15 ENCOUNTER — PATIENT MESSAGE (OUTPATIENT)
Dept: ORTHOPEDICS | Facility: CLINIC | Age: 57
End: 2017-05-15

## 2017-05-16 ENCOUNTER — TELEPHONE (OUTPATIENT)
Dept: ORTHOPEDICS | Facility: CLINIC | Age: 57
End: 2017-05-16

## 2017-05-16 NOTE — TELEPHONE ENCOUNTER
Spoke to pt, informed Dr. Garcia and myself are working on his PT denial appeal. Informed pt that Jeimy with [employer] has left a message and awaiting call back from Brooklynn Corrigan his , regarding the denial and appeal. Informed pt once I receive instructions from Jeimy I will forward to Jose and notify the pt. Pt verbalized understanding.

## 2017-05-17 ENCOUNTER — TELEPHONE (OUTPATIENT)
Dept: ORTHOPEDICS | Facility: CLINIC | Age: 57
End: 2017-05-17

## 2017-05-17 NOTE — TELEPHONE ENCOUNTER
Spoke to pt, informed that I spoke with [facility]. She stated Dr. Garcia will need to do a state appeal 1009 form. The PT was denied on 5/15/17. It will be done within the 15 days. Informed pt I will work on it tomorrow and have it completed and faxed to the state tomorrow. Pt pleased and verbalized understanding. Notified Dr. Garcia.

## 2017-05-30 ENCOUNTER — PATIENT MESSAGE (OUTPATIENT)
Dept: ORTHOPEDICS | Facility: CLINIC | Age: 57
End: 2017-05-30

## 2017-06-11 PROCEDURE — 99283 EMERGENCY DEPT VISIT LOW MDM: CPT | Mod: 25

## 2017-06-11 PROCEDURE — 96372 THER/PROPH/DIAG INJ SC/IM: CPT

## 2017-06-12 ENCOUNTER — HOSPITAL ENCOUNTER (EMERGENCY)
Facility: HOSPITAL | Age: 57
Discharge: HOME | End: 2017-06-12
Attending: EMERGENCY MEDICINE
Payer: OTHER MISCELLANEOUS

## 2017-06-12 VITALS
HEIGHT: 67 IN | WEIGHT: 240 LBS | HEART RATE: 74 BPM | BODY MASS INDEX: 37.67 KG/M2 | SYSTOLIC BLOOD PRESSURE: 128 MMHG | OXYGEN SATURATION: 96 % | TEMPERATURE: 98 F | DIASTOLIC BLOOD PRESSURE: 74 MMHG | RESPIRATION RATE: 18 BRPM

## 2017-06-12 DIAGNOSIS — M54.50 ACUTE BILATERAL LOW BACK PAIN WITHOUT SCIATICA: Primary | ICD-10-CM

## 2017-06-12 PROCEDURE — 63600175 PHARM REV CODE 636 W HCPCS: Performed by: EMERGENCY MEDICINE

## 2017-06-12 RX ORDER — METHOCARBAMOL 500 MG/1
1000 TABLET, FILM COATED ORAL 3 TIMES DAILY
Qty: 30 TABLET | Refills: 0 | Status: SHIPPED | OUTPATIENT
Start: 2017-06-12 | End: 2017-06-17

## 2017-06-12 RX ORDER — MELOXICAM 7.5 MG/1
7.5 TABLET ORAL DAILY
Qty: 20 TABLET | Refills: 0 | Status: SHIPPED | OUTPATIENT
Start: 2017-06-12 | End: 2017-07-10

## 2017-06-12 RX ORDER — ORPHENADRINE CITRATE 30 MG/ML
60 INJECTION INTRAMUSCULAR; INTRAVENOUS
Status: COMPLETED | OUTPATIENT
Start: 2017-06-12 | End: 2017-06-12

## 2017-06-12 RX ORDER — KETOROLAC TROMETHAMINE 30 MG/ML
60 INJECTION, SOLUTION INTRAMUSCULAR; INTRAVENOUS
Status: COMPLETED | OUTPATIENT
Start: 2017-06-12 | End: 2017-06-12

## 2017-06-12 RX ADMIN — ORPHENADRINE CITRATE 60 MG: 30 INJECTION INTRAMUSCULAR; INTRAVENOUS at 01:06

## 2017-06-12 RX ADMIN — KETOROLAC TROMETHAMINE 60 MG: 30 INJECTION, SOLUTION INTRAMUSCULAR; INTRAVENOUS at 01:06

## 2017-06-12 NOTE — DISCHARGE INSTRUCTIONS
Additional instructions  Followup with your primary care physician in 2-3 days as needed.  You will need to follow-up with orthopedic surgery.  Please call for an appointment.  Take all your medications as prescribed. Return to the emergency department if you have increasing pain, clavicular gait or have any sensation changes, bowel or bladder incontinence or any other concerns. Please refer to additional educational material for further instructions.

## 2017-06-12 NOTE — ED TRIAGE NOTES
Pt. C/o having lower back pain after his left knee gave out today while walking . Pt. Had a left knee replacement a year ago and has muscle damage to his thigh from his replacement.

## 2017-06-12 NOTE — ED PROVIDER NOTES
Encounter Date: 6/11/2017       History     Chief Complaint   Patient presents with    Back Pain     chronic back grafually worse over 1 day. no relief witth home rx pain meds     Review of patient's allergies indicates:   Allergen Reactions    Shellfish containing products Anaphylaxis     crawfish    Sulfa (sulfonamide antibiotics) Anaphylaxis     CHIEF COMPLAINT: Low back pain    HISTORY OF PRESENT ILLNESS: This is a 57-year-old gentleman presents to the emergency department with an acute exacerbation of his chronic back pain.  He reports that ever since he had his knee replacement done about a year ago his been having some issues with his back.  He reports that he's having to compensate for the knee surgery which in turn affects the SI joint and causes low back pain bilaterally.  He denies any numbness or tingling down the leg.  Denies any bowel or bladder incontinence.  He reports that today his knee gave out on him then he started experiencing increased back pain.  He did not have any trauma.      REVIEW OF SYSTEMS:  Constitutional: No fever, no chills.  Eyes: No discharge. No pain.  HENT: No nasal drainage. No ear ache. No sore throat.  Cardiovascular: No chest pain, no palpitations.  Respiratory: No cough, no shortness of breath.  Gastrointestinal: No abdominal pain, no vomiting. No diarrhea.  Genitourinary: No hematuria, dysuria, urgency.  Musculoskeletal: See above  Skin: No rashes, no lesions.  Neurological: No headache, no focal weakness.    ALLERGIES reviewed  Family history reviewed  Home medications reviewed  Problem list reviewed          The history is provided by the patient.     Past Medical History:   Diagnosis Date    Arthritis     GERD (gastroesophageal reflux disease)      Past Surgical History:   Procedure Laterality Date    KNEE SURGERY Left 2009    partial knee      KNEE SURGERY Left 05/03/2016    TKR    WRIST SURGERY       History reviewed. No pertinent family history.  Social  History   Substance Use Topics    Smoking status: Never Smoker    Smokeless tobacco: Never Used    Alcohol use Yes      Comment: occ     Review of Systems   All other systems reviewed and are negative.      Physical Exam     Initial Vitals [06/11/17 2254]   BP Pulse Resp Temp SpO2   (!) 149/89 87 18 97.9 °F (36.6 °C) 100 %     Physical Exam    Nursing note and vitals reviewed.  Constitutional: He appears well-developed and well-nourished. He is not diaphoretic. No distress.   HENT:   Head: Normocephalic and atraumatic.   Nose: Nose normal.   Mouth/Throat: Oropharynx is clear and moist.   Eyes: Conjunctivae and EOM are normal. Pupils are equal, round, and reactive to light. No scleral icterus.   Neck: Normal range of motion. Neck supple. No JVD present.   Cardiovascular: Normal rate, regular rhythm and normal heart sounds. Exam reveals no gallop and no friction rub.    No murmur heard.  Pulmonary/Chest: Breath sounds normal. No stridor. No respiratory distress. He has no wheezes. He exhibits no tenderness.   Abdominal: Soft. Bowel sounds are normal. He exhibits no distension and no mass. There is no tenderness. There is no rebound and no guarding.   Musculoskeletal: Normal range of motion. He exhibits no edema.   There is no midline tenderness to palpation down the length of the C, T, L, sacral spine.  There is no step-off.  No crepitus.  He does have some bilateral low lumbar pain and some muscle spasm worse on the left than the right.  Negative straight leg raise.   Lymphadenopathy:     He has no cervical adenopathy.   Neurological: He is alert and oriented to person, place, and time. He has normal strength. No cranial nerve deficit.   Skin: Skin is warm and dry. No rash noted. No pallor.   Psychiatric: He has a normal mood and affect. Thought content normal.         ED Course   Procedures  Labs Reviewed - No data to display          Medical Decision Making:   Differential Diagnosis:   Strain, sprain, fracture,  dislocation, malignancy, abscess, shingles, UTI.    ED Management:  The patient presents to the emergency department with an acute exacerbation of his chronic back pain.  The patient has no red flakes, there is no need for imaging studies at this time.  He feels improved following Toradol and muscle relaxer.  At this time I will discharge him home with symptom control and have him follow-up with orthopedic surgery for further evaluation.  The patient is comfortable with this plan and comfortable going home at this time.  After taking into careful account the historical factors and physical exam findings of the patient's presentation today no acute emergent medical condition has been identified. The patient appears to be low risk for an emergent medical condition and I feel it is safe and appropriate at this time for the patient to be discharged to follow-up as detailed in their discharge instructions for reevaluation and possible continued outpatient workup and management. I have discussed the specifics of the workup with the patient and the patient has verbalized understanding of the details of the workup, the diagnosis, the treatment plan, and the need for outpatient follow-up. In addition, I have advised the patient that they can return to the ED and/or activate EMS at any time with worsening of their symptoms, change of their symptoms, or with any other medical complaint. The patient remained comfortable and stable during their visit in the ED.  Discharge and follow-up instructions discussed with the patient who expressed understanding and willingness to comply with my recommendations.     This medical record was prepared using voice dictation software. There may be phonetic errors.                   ED Course   0200 the patient reports his pain is much improved.  He feels ready to be discharged home.  The patient has no red flakes with his back pain.  We will prescribe symptom control and have him follow-up  with orthopedics and primary care for further evaluation and management of his pain.  Clinical Impression:   The encounter diagnosis was Acute bilateral low back pain without sciatica.          Patricia Apodaca MD  06/12/17 0256

## 2017-06-15 ENCOUNTER — PATIENT MESSAGE (OUTPATIENT)
Dept: ORTHOPEDICS | Facility: CLINIC | Age: 57
End: 2017-06-15

## 2017-06-15 DIAGNOSIS — M25.569 KNEE PAIN, UNSPECIFIED CHRONICITY, UNSPECIFIED LATERALITY: Primary | ICD-10-CM

## 2017-06-17 ENCOUNTER — PATIENT MESSAGE (OUTPATIENT)
Dept: ORTHOPEDICS | Facility: CLINIC | Age: 57
End: 2017-06-17

## 2017-06-19 ENCOUNTER — TELEPHONE (OUTPATIENT)
Dept: ORTHOPEDICS | Facility: CLINIC | Age: 57
End: 2017-06-19

## 2017-06-19 DIAGNOSIS — M54.16 LUMBAR RADICULOPATHY: Primary | ICD-10-CM

## 2017-06-19 NOTE — TELEPHONE ENCOUNTER
----- Message from Alvarez Garcia MD sent at 6/19/2017  3:09 PM CDT -----  Done  ----- Message -----  From: Yarely Serrano MA  Sent: 6/19/2017   2:39 PM  To: Alvarez Garcia MD    Can you please put in a referral to  please check the work comp box. Thanks

## 2017-06-19 NOTE — TELEPHONE ENCOUNTER
Spoke to pt and he was given an appointment with  on 7/10/17 and [facility] and the jury duty letter was faxed and mailed to pt home. Pt was pleased.

## 2017-06-27 ENCOUNTER — CLINICAL SUPPORT (OUTPATIENT)
Dept: REHABILITATION | Facility: HOSPITAL | Age: 57
End: 2017-06-27
Attending: ORTHOPAEDIC SURGERY
Payer: OTHER MISCELLANEOUS

## 2017-06-27 DIAGNOSIS — G89.29 CHRONIC PAIN OF LEFT KNEE: ICD-10-CM

## 2017-06-27 DIAGNOSIS — R26.2 DIFFICULTY WALKING: ICD-10-CM

## 2017-06-27 DIAGNOSIS — M54.41 CHRONIC BILATERAL LOW BACK PAIN WITH RIGHT-SIDED SCIATICA: Primary | ICD-10-CM

## 2017-06-27 DIAGNOSIS — M62.81 MUSCLE WEAKNESS: ICD-10-CM

## 2017-06-27 DIAGNOSIS — M25.562 CHRONIC PAIN OF LEFT KNEE: ICD-10-CM

## 2017-06-27 DIAGNOSIS — G89.29 CHRONIC BILATERAL LOW BACK PAIN WITH RIGHT-SIDED SCIATICA: Primary | ICD-10-CM

## 2017-06-27 DIAGNOSIS — M25.662 DECREASED ROM OF LEFT KNEE: ICD-10-CM

## 2017-06-27 PROCEDURE — 97110 THERAPEUTIC EXERCISES: CPT | Performed by: PHYSICAL THERAPIST

## 2017-06-27 PROCEDURE — 97161 PT EVAL LOW COMPLEX 20 MIN: CPT | Performed by: PHYSICAL THERAPIST

## 2017-06-27 NOTE — PLAN OF CARE
Name:John Macedo  Physician:Alvarez Garcia MD  Date of eval:6/27/2017  Orders:  Physical Therapy evaluate and treat  Clinic: [id number]  Diagnosis:  1. Chronic bilateral low back pain with right-sided sciatica     2. Chronic pain of left knee     3. Decreased ROM of left knee     4. Difficulty walking     5. Muscle weakness         Precautions: as per diagnosis TKR L May 2016  Evaluation date: 6/27/2017  Visit # authorized: 1/12  Authorization period: 6-15-18  Plan of care expiration: 8-7-17  MD Follow up appt: Dr. Montero 7-10, none scheduled with Dr. Garcia    Subjective     Chief complaint: low back pain lower R side radiating across back.  Knee is a dull aching hurt that is constant, increases with use.    Onset of pain : 2 months ago   Mechanism of onset :  Pt has been pushing more to get back to normal activity which has led to increased back pain resulting in need to go to ER due to severe pain    Radicular symptoms:R buttock   Aggravating factors: walking moving around, sitting, yardwork  Easing factors: recliner  Sleep is not disturbed now Pt is now taking medication to help sleep Sleeping position: start on R side and then alternate to try and get comfortable.    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 Unable to push clutch and unable to work more than 3-4 hours    Allergies:    Review of patient's allergies indicates:   Allergen Reactions    Shellfish containing products Anaphylaxis     crawfish    Sulfa (sulfonamide antibiotics) Anaphylaxis       Medical history: Pt now has HBP on medication  Past Medical History:   Diagnosis Date    Arthritis     GERD  "(gastroesophageal reflux disease)        Medication:   Current Outpatient Prescriptions on File Prior to Visit   Medication Sig Dispense Refill    meloxicam (MOBIC) 7.5 MG tablet Take 1 tablet (7.5 mg total) by mouth once daily. 20 tablet 0    omeprazole (PRILOSEC OTC) 20 MG tablet Take 20 mg by mouth every morning.       oxycodone-acetaminophen (PERCOCET)  mg per tablet Take 1 tablet by mouth every 4 (four) hours as needed. 90 tablet 0     No current facility-administered medications on file prior to visit.            Xray: March 2017 Left knee arthroplasty appears intact, femoral and tibial components are well-positioned and well aligned without loosening.  No large knee joint effusion.  No acute displaced fracture or dislocation of the knee.  The patella is intact.    Pain level with 0 being the lowest and 10 being the highest presently: knee 5  back 7-8  Pain level with 0 being the lowest and 10 being the highest at worst: knee 7, back 10  Pain level with 0 being the lowest and 10 being the highest at best: knee 3-4, back 6     Patient Goals: "be able to work 8 hours and return to ADL with less pain"    Objective     Postural examination in standing:  - weight shift to R with slight L knee flexion  - normal lumbar lordosis  - forward head  - forward shoulders  - R hip high  - l shoulder high    Postural examination in sitting:   - normal lumbar lordosis  - forward head  - forward shoulders      Functional assessment:   - walking: lacks full knee extension at heel strike, antalgic and c/o pain as transition from foot flat to push off right before push off  - sit to stand: mod hand assist  - sit to supine: no difficulty       - supine to sit: no difficulty  - supine to prone: no difficulty    Pelvic positioning: AR R    Lumbar active range of motion in standing is:  - flexion - knee  No reversal of lumbar noted                   - extension -  10%                         - right side bending -  4" above " knee  Decreased C curve  - left side bending -  Mid thigh  Decreased C curve        Knee  ROM: (measured in degrees)   Knee (R) (L)   Flexion 130 80   Extension 5 -15          Flexibility testing:  - hamstrings:     90/90 test R 30 L 30           - gastrocnemius:   DF ankle R 10 degrees L 5 degrees     Muscle Strength  MMT R L   Hip flexion 5/5 4-/5   Hip abduction 5/5 3+/5   Hip extension 4+/5 4-/5   Glut max 3+/5 3/5   Hip adduction 5/5 4+/5   Knee extension 5/5 3+/5   Knee flexion 5/5 4-/5   Ankle dorsiflexion 5/5 5/5   Ankle plantar flexion 5/5 4-/5   Ankle inversion 5/5 5/5   Ankle eversion 5/5 5/5       Endurance is  fair     Lumbar Special tests:  SLR neg    Palpation: mod-severe R lumbar paraspinals and QL    Joint mobility: normal spring lumbar    Sensation: Impaired: decreased lateral knee since first knee surgery had partial knee replacement  Reflexes: unable to elicit knee or ankle      Outcome measures:   FOTO lumbar disability index: 26  PT reviewed FOTO scores for John Macedo on 06/27/2017.   FOTO scores were entered into EPIC - see media section.    PT Evaluation Completed? Yes  Discussed Plan of Care with patient: Yes      TREATMENT:  Therapeutic exercise: John received therapeutic exercises to develop strength, stabilization and endurance; flexibility and range of motion for 15 minutes including:see HEP sheet.   Pt was performing push/pull incorrectly, therefore not getting pelvis level remaining in dysfunction  Single knee to chest  x10  Piriformis stretch x 10  Hamstring stretch supine x 10  SLR x 4  X 2/10  Hip ext prone with bent knee 2/10  Bridge 2/10  Push/pull x 3    Pt. Education: Instructed pt. regarding:proper technique with all exercises. Pt. to demonstrate good understanding of the education provided. John demonstrated good return demonstration of activities. No cultural, environmental, or spiritual barriers identified to treatment or learning.  Assessment   This is a 57  y.o. male referred to outpatient physical therapy and presents with a medical diagnosis of knee and LBP and PT diagnosis/findings of pelvic dysfunction, knee pain with loss of ROM and strength and antalgic gait demonstrating limitations as described in the problem list. Patient was in agreement with set goals and plan of care. Pt was given a written HEP along with posture education, instruction on body mechanics, activity modification/avoidance, and core/lumbar/LE strengthening regimen. Pt. verbally understood instructions and demonstrated proper form/technique. Pt was advised to perform these exercises free of pain, and discontinue use if symptoms persist/worsen. Pt will benefit from physical therapy services in order to maximize pain free functional independence. Rehab potential is good.      Medical necessity is demonstrated by the following IMPAIRMENTS/PROBLEM LIST:  Decreased range of motion  Decreased strength  Pelvic dysfunction  Increased pain with walking  Antalgic gait  Increased pain with sitting  Increased pain with yardwork  Increased pain with work over 3-4 hours  ADL and household activities lead to increased pain and are limited  Functional impairment rating of: 26    GOALS:   Short Term Goals:  3 weeks  Increase range of motion 25%  Increase strength 1/2 muscle grade  Improve postural awareness of pelvis to independently identify dysfunction with min assist from PT  Be able to perform HEP with minimal cueing required  Improve functional impairment to 30    Long Term Goals: 6 weeks  Increase range of motion to 75% to 100% full   Improve muscle strength 1 muscle grade  Improve muscle strength with MMT to 4+/5 to 5/5  Improve and stabilize proper pelvic positioning  Restore ability to ambulate with near normal gait with pain at worst 3 or below  Walking for ADL  will be restored with pain at worst 3 or below  Restore ability to sit for ADL with pain at worst 3 or below  Restore ability to perform  yardwork with pain at worst 3 or below  Restore ability to perform ADL's and household activities independently and with pain at worst 3 or below  Improve functional impairment to 35    Plan     Pt will be treated by physical therapy 1-3 times a week for 6 weeks to include: Therapeutic exercises to increase ROM, strength and stabilization; joint and soft tissue mobilization with manual therapy techniques to decrease muscle tightness, pain and improve joint mobility; neuromuscular re-education to improve balance, coordination, kinesthetic sense and proprioception, therapeutic activities to improve coordination, strength and function, therapeutic taping to decrease pain, provide support and improve function; modalities such as moist heat, ice, ultrasound and electrical stimulation to increase circulation, decrease pain and inflammation; dry needling with manual therapy techniques to decrease pain, inflammation and swelling, increase circulation and promote healing process will be considered and utilized as needed; temporary orthotics will be considered and utilized as needed to further decrease pain in WB; mechanical traction will be considered and utilized as needed to decompress spine and decrease muscle tightness and pain,aquatic physical therapy will be utilized as needed.  Pt may be seen by PTA to carry out plan of care as part of the Rehab team.    I certify the need for these services furnished under this plan of treatment and while under my care.    ____________________________________ Physician/Referring Practitioner                                Date of Signature

## 2017-06-27 NOTE — PROGRESS NOTES
Name:John Macedo  Physician:Alvarez Garcia MD  Date of eval:6/27/2017  Orders:  Physical Therapy evaluate and treat  Clinic: [id number]  Diagnosis:  1. Chronic bilateral low back pain with right-sided sciatica     2. Chronic pain of left knee     3. Decreased ROM of left knee     4. Difficulty walking     5. Muscle weakness         Precautions: as per diagnosis TKR L May 2016  Evaluation date: 6/27/2017  Visit # authorized: 1/12  Authorization period: 6-15-18  Plan of care expiration: 8-7-17  MD Follow up appt: Dr. Montero 7-10, none scheduled with Dr. Garcia    Subjective     Chief complaint: low back pain lower R side radiating across back.  Knee is a dull aching hurt that is constant, increases with use.    Onset of pain : 2 months ago   Mechanism of onset :  Pt has been pushing more to get back to normal activity which has led to increased back pain resulting in need to go to ER due to severe pain    Radicular symptoms:R buttock   Aggravating factors: walking moving around, sitting, yardwork  Easing factors: recliner  Sleep is not disturbed now Pt is now taking medication to help sleep Sleeping position: start on R side and then alternate to try and get comfortable.    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 Unable to push clutch and unable to work more than 3-4 hours    Allergies:    Review of patient's allergies indicates:   Allergen Reactions    Shellfish containing products Anaphylaxis     crawfish    Sulfa (sulfonamide antibiotics) Anaphylaxis       Medical history: Pt now has HBP on medication  Past Medical History:   Diagnosis Date    Arthritis     GERD  "(gastroesophageal reflux disease)        Medication:   Current Outpatient Prescriptions on File Prior to Visit   Medication Sig Dispense Refill    meloxicam (MOBIC) 7.5 MG tablet Take 1 tablet (7.5 mg total) by mouth once daily. 20 tablet 0    omeprazole (PRILOSEC OTC) 20 MG tablet Take 20 mg by mouth every morning.       oxycodone-acetaminophen (PERCOCET)  mg per tablet Take 1 tablet by mouth every 4 (four) hours as needed. 90 tablet 0     No current facility-administered medications on file prior to visit.            Xray: March 2017 Left knee arthroplasty appears intact, femoral and tibial components are well-positioned and well aligned without loosening.  No large knee joint effusion.  No acute displaced fracture or dislocation of the knee.  The patella is intact.    Pain level with 0 being the lowest and 10 being the highest presently: knee 5  back 7-8  Pain level with 0 being the lowest and 10 being the highest at worst: knee 7, back 10  Pain level with 0 being the lowest and 10 being the highest at best: knee 3-4, back 6     Patient Goals: "be able to work 8 hours and return to ADL with less pain"    Objective     Postural examination in standing:  - weight shift to R with slight L knee flexion  - normal lumbar lordosis  - forward head  - forward shoulders  - R hip high  - l shoulder high    Postural examination in sitting:   - normal lumbar lordosis  - forward head  - forward shoulders      Functional assessment:   - walking: lacks full knee extension at heel strike, antalgic and c/o pain as transition from foot flat to push off right before push off  - sit to stand: mod hand assist  - sit to supine: no difficulty       - supine to sit: no difficulty  - supine to prone: no difficulty    Pelvic positioning: AR R    Lumbar active range of motion in standing is:  - flexion - knee  No reversal of lumbar noted                   - extension -  10%                         - right side bending -  4" above " knee  Decreased C curve  - left side bending -  Mid thigh  Decreased C curve        Knee  ROM: (measured in degrees)   Knee (R) (L)   Flexion 130 80   Extension 5 -15          Flexibility testing:  - hamstrings:     90/90 test R 30 L 30           - gastrocnemius:   DF ankle R 10 degrees L 5 degrees     Muscle Strength  MMT R L   Hip flexion 5/5 4-/5   Hip abduction 5/5 3+/5   Hip extension 4+/5 4-/5   Glut max 3+/5 3/5   Hip adduction 5/5 4+/5   Knee extension 5/5 3+/5   Knee flexion 5/5 4-/5   Ankle dorsiflexion 5/5 5/5   Ankle plantar flexion 5/5 4-/5   Ankle inversion 5/5 5/5   Ankle eversion 5/5 5/5       Endurance is  fair     Lumbar Special tests:  SLR neg    Palpation: mod-severe R lumbar paraspinals and QL    Joint mobility: normal spring lumbar    Sensation: Impaired: decreased lateral knee since first knee surgery had partial knee replacement  Reflexes: unable to elicit knee or ankle      Outcome measures:   FOTO lumbar disability index: 26  PT reviewed FOTO scores for John Macedo on 06/27/2017.   FOTO scores were entered into EPIC - see media section.    PT Evaluation Completed? Yes  Discussed Plan of Care with patient: Yes      TREATMENT:  Therapeutic exercise: John received therapeutic exercises to develop strength, stabilization and endurance; flexibility and range of motion for 15 minutes including:see HEP sheet.   Pt was performing push/pull incorrectly, therefore not getting pelvis level remaining in dysfunction  Single knee to chest  x10  Piriformis stretch x 10  Hamstring stretch supine x 10  SLR x 4  X 2/10  Hip ext prone with bent knee 2/10  Bridge 2/10  Push/pull x 3    Pt. Education: Instructed pt. regarding:proper technique with all exercises. Pt. to demonstrate good understanding of the education provided. John demonstrated good return demonstration of activities. No cultural, environmental, or spiritual barriers identified to treatment or learning.  Assessment   This is a 57  y.o. male referred to outpatient physical therapy and presents with a medical diagnosis of knee and LBP and PT diagnosis/findings of pelvic dysfunction, knee pain with loss of ROM and strength and antalgic gait demonstrating limitations as described in the problem list. Patient was in agreement with set goals and plan of care. Pt was given a written HEP along with posture education, instruction on body mechanics, activity modification/avoidance, and core/lumbar/LE strengthening regimen. Pt. verbally understood instructions and demonstrated proper form/technique. Pt was advised to perform these exercises free of pain, and discontinue use if symptoms persist/worsen. Pt will benefit from physical therapy services in order to maximize pain free functional independence. Rehab potential is good.      Medical necessity is demonstrated by the following IMPAIRMENTS/PROBLEM LIST:  Decreased range of motion  Decreased strength  Pelvic dysfunction  Increased pain with walking  Antalgic gait  Increased pain with sitting  Increased pain with yardwork  Increased pain with work over 3-4 hours  ADL and household activities lead to increased pain and are limited  Functional impairment rating of: 26    GOALS:   Short Term Goals:  3 weeks  Increase range of motion 25%  Increase strength 1/2 muscle grade  Improve postural awareness of pelvis to independently identify dysfunction with min assist from PT  Be able to perform HEP with minimal cueing required  Improve functional impairment to 30    Long Term Goals: 6 weeks  Increase range of motion to 75% to 100% full   Improve muscle strength 1 muscle grade  Improve muscle strength with MMT to 4+/5 to 5/5  Improve and stabilize proper pelvic positioning  Restore ability to ambulate with near normal gait with pain at worst 3 or below  Walking for ADL  will be restored with pain at worst 3 or below  Restore ability to sit for ADL with pain at worst 3 or below  Restore ability to perform  yardwork with pain at worst 3 or below  Restore ability to perform ADL's and household activities independently and with pain at worst 3 or below  Improve functional impairment to 35    Plan     Pt will be treated by physical therapy 1-3 times a week for 6 weeks to include: Therapeutic exercises to increase ROM, strength and stabilization; joint and soft tissue mobilization with manual therapy techniques to decrease muscle tightness, pain and improve joint mobility; neuromuscular re-education to improve balance, coordination, kinesthetic sense and proprioception, therapeutic activities to improve coordination, strength and function, therapeutic taping to decrease pain, provide support and improve function; modalities such as moist heat, ice, ultrasound and electrical stimulation to increase circulation, decrease pain and inflammation; dry needling with manual therapy techniques to decrease pain, inflammation and swelling, increase circulation and promote healing process will be considered and utilized as needed; temporary orthotics will be considered and utilized as needed to further decrease pain in WB; mechanical traction will be considered and utilized as needed to decompress spine and decrease muscle tightness and pain,aquatic physical therapy will be utilized as needed.  Pt may be seen by PTA to carry out plan of care as part of the Rehab team.    I certify the need for these services furnished under this plan of treatment and while under my care.    ____________________________________ Physician/Referring Practitioner                                Date of Signature

## 2017-07-10 ENCOUNTER — OFFICE VISIT (OUTPATIENT)
Dept: SPINE | Facility: CLINIC | Age: 57
End: 2017-07-10
Attending: ORTHOPAEDIC SURGERY
Payer: OTHER MISCELLANEOUS

## 2017-07-10 VITALS
BODY MASS INDEX: 37.67 KG/M2 | WEIGHT: 240 LBS | HEIGHT: 67 IN | SYSTOLIC BLOOD PRESSURE: 143 MMHG | HEART RATE: 82 BPM | DIASTOLIC BLOOD PRESSURE: 98 MMHG

## 2017-07-10 DIAGNOSIS — M53.3 CHRONIC RIGHT SI JOINT PAIN: Primary | ICD-10-CM

## 2017-07-10 DIAGNOSIS — G89.29 CHRONIC RIGHT SI JOINT PAIN: Primary | ICD-10-CM

## 2017-07-10 PROCEDURE — 99212 OFFICE O/P EST SF 10 MIN: CPT | Mod: S$GLB,,, | Performed by: ORTHOPAEDIC SURGERY

## 2017-07-10 PROCEDURE — 99999 PR PBB SHADOW E&M-EST. PATIENT-LVL III: CPT | Mod: PBBFAC,,, | Performed by: ORTHOPAEDIC SURGERY

## 2017-07-10 RX ORDER — DICLOFENAC SODIUM 10 MG/G
2 GEL TOPICAL 4 TIMES DAILY
Qty: 1 TUBE | Refills: 6 | Status: ON HOLD | OUTPATIENT
Start: 2017-07-10 | End: 2021-10-16 | Stop reason: HOSPADM

## 2017-07-10 RX ORDER — VALSARTAN AND HYDROCHLOROTHIAZIDE 160; 12.5 MG/1; MG/1
1 TABLET, FILM COATED ORAL DAILY
Status: ON HOLD | COMMUNITY
End: 2021-10-16 | Stop reason: HOSPADM

## 2017-07-10 NOTE — LETTER
July 12, 2017      Alvarez Garcia MD  [address]  [facility] [address]           [facility]  [address], [address]  [facility] [address]  Phone: [phone]  Fax: [phone]          Patient: John Macedo   MR Number: [id number]   YOB: 1960   Date of Visit: 7/10/2017       Dear Dr. Alvarez Garcia:    Thank you for referring John Macedo to me for evaluation. Attached you will find relevant portions of my assessment and plan of care.    If you have questions, please do not hesitate to call me. I look forward to following John Macedo along with you.    Sincerely,    Lc Montero MD    Enclosure  CC:  No Recipients    If you would like to receive this communication electronically, please contact [email or web address] or [phone] to request more information on [facility] Link access.    For providers and/or their staff who would like to refer a patient to [facility], please contact us through our one-stop-shop provider referral line, [facility], at [phone].    If you feel you have received this communication in error or would no longer like to receive these types of communications, please e-mail [email or web address]

## 2017-07-10 NOTE — PROGRESS NOTES
The patient returns for follow up. I saw him 10 months ago for R SIJ dysfunction. Was previously scheduled for repeat R SI joint injection, but was frustrated with the paperwork for workman's comp, gave up, and did not get injection. Now also complains of left sided posterior low back pain similar to right. Pain worse with moving or sitting. Restarted PT 2 weeks ago for knee, but they have also been working on SI joints, which is helping. Takes Nucynta and gabapentin for pain, which helps.     Exam:   AAOx4  NAD  RRR  No increased WOB  Aquacel c/d/i  Some tenderness over R SI joint  No muscle or sensation deficits L2-S1 bilaterally  Pain to right SI joint with L and R LIAN tests    Bilateral SI joint dysfunction  - He cannot take NSAIDs (causes stomach aches and indigestion).  - Voltaren gel  - Continue PT  - Repeat right SI joint injection, he has had significant benefit in the past from this.    I spent 10 minutes with the patient of which greater than 1/2 the time was devoted to counciling the patient regarding treatment options.

## 2017-07-12 ENCOUNTER — CLINICAL SUPPORT (OUTPATIENT)
Dept: REHABILITATION | Facility: HOSPITAL | Age: 57
End: 2017-07-12
Attending: ORTHOPAEDIC SURGERY
Payer: OTHER MISCELLANEOUS

## 2017-07-12 DIAGNOSIS — M25.562 CHRONIC PAIN OF LEFT KNEE: ICD-10-CM

## 2017-07-12 DIAGNOSIS — G89.29 CHRONIC PAIN OF LEFT KNEE: ICD-10-CM

## 2017-07-12 DIAGNOSIS — M54.41 CHRONIC BILATERAL LOW BACK PAIN WITH RIGHT-SIDED SCIATICA: Primary | ICD-10-CM

## 2017-07-12 DIAGNOSIS — R26.2 DIFFICULTY WALKING: ICD-10-CM

## 2017-07-12 DIAGNOSIS — M25.662 DECREASED ROM OF LEFT KNEE: ICD-10-CM

## 2017-07-12 DIAGNOSIS — G89.29 CHRONIC BILATERAL LOW BACK PAIN WITH RIGHT-SIDED SCIATICA: Primary | ICD-10-CM

## 2017-07-12 DIAGNOSIS — M62.81 MUSCLE WEAKNESS: ICD-10-CM

## 2017-07-12 PROCEDURE — 97110 THERAPEUTIC EXERCISES: CPT | Performed by: PHYSICAL THERAPIST

## 2017-07-12 NOTE — LETTER
[facility] FORM 1010 - REQUEST OF AUTHORIZATION/CARRIER OR SELF INSURED EMPLOYER RESPONSE  PLEASE PRINT OR TYPE  SECTION 1. IDENTIFYING INFORMATION - To Be Filled Out By Health Care Provider    P  A Last Name:   Hellen First:   John Middle:   [address] Address, [facility], [facility], Zip:   [address] [address] [facility] [address]   T  I Last 4 Digits of Social Security Number:   [id number] YOB: 1960 Phone Number:    [phone] (home) [phone] (work) Date of Injury:       E  N  T Employers Name:        Street Address, [place], [facility], Zip:    ,  ,  ,   Phone Number:         C  A  R  R Name:   Rico :   [employer] Claim Number (if known):      [id number]   [address] Address, [facility], [facility], Zip:   [facility] [address] [place] [address] Email Address:   [facility] Phone Number:   [phone] Fax Number:   [phone]   SECTION 2. REQUEST FOR AUTHORIZATION - To Be Filled Out By Health Care Provider      P Requesting Health Care Provider:   DR. DOUGLAS/[facility] Phone Number:   [phone] Fax Number:   [phone]   R  [address] Address, [facility], Zip:   [facility] [place] [address] Email:    [email or web address]   V  I Diagnosis:   CHRONIC RIGHT SI JOINT PAIN CPT/DRG Code:   94906 ICD/DSM Code:   M53.3 G89.29   D  E Requested Treatment or Testing (Attach Supplement If Needed): RIGHT SI JOINT INJECTION   R Reason for Treatment or Testing (Attach Supplement If Needed): CHRONIC RIGHT SI JOINT PAIN   INFORMATION REQUIRED BY RULE TO BE INCLUDED WITH REQUEST FOR AUTHORIZATION - To Be Filled Out By Health Care Provider  (Following is the required minimum information for Request of Authorization (LAC 40:2715 (C))                    []  History provided to the level of condition and as provided by Medical Treatment Schedule   P                  []  Physical Findings/Clinical Tests   R                 []  Documented functional improvements from prior treatment   O                 []   Test/imaging results   V                 []  Treatment Plan including services being requested along with the frequency and duration   I  D  E                                                                                                                                            []     Faxed          to the [employer]Self Insured Employer on this the      I hereby certify that this completed form and above required information was                                                           12     day of   07,               2017                                                                                                                             []      Emailed                  (day)                 (month)         (year)   R Signature of Health Care Provider: 0 Printed Name:    DR. EVELIO DOUGLAS/MALIK   SECTION 3. RESPONSE OF CARRIER/SELF INSURED EMPLOYER FOR AUTHORIZATION  (Check appropriate box below and return to requesting Health Care Provider, Claimant and Claimant  as provided by rule)       []  The requested Treatment or Testing is approved       []  The requested Treatment or Testing is approved with modifications (Attach summary of reasons and explanation of any modifications)       []  The requested Treatment or Testing is denied because                                       []          Not in accordance with Medical Treatment Schedule or R.S.23:1203.1(D) (Attach summary of reasons)                                       []          The request, or a portion thereof, is not related to the on-the-job injury                                       []          The claim is being denied as non-compensable                                       []          Other (Attach brief explanation)   C  A  R  R  I                                                                                                                                            []     Faxed          to the Health Care Provider (and to the   of                                                                                                                                                                             Claimant if one exists, if denied or approved with   I hereby certify that this response of  [employer] Insured Employer for Authorization was                                                 modification) on this the                                                                                                                                                                                     ______  day of   _______ ,   _______                                                                                                                             []      Emailed                  (day)                 (month)         (year)   E  R Signature of [employer]/Self Insured Employer or Utilization Review Company: Printed Name:           []   The prior denied or approved with modification request is now  approved                                                                                                                                               []     Faxed          to the Health Care Provider and  of Claimant                                                                                                                                                                                                    if one exists on this the   I hereby certify that this response of  [employer]Self Insured Employer for Authorization was                                      ______  day of   _______ ,   _______                                                                                                                                             []      Emailed                      (day)                 (month)         (year)    Signature of Car[employer]/Self Insured Employer or Utilization Review Company:  Printed Name:       SECTION 4. FIRST  REQUEST   (Form 1010A is required to be filled out by [employer]/Self Insured Employer and Health Care Provider)   C           []  The requested Treatment or Testing is delayed because minimum information required by rule was not provided   A  R  R  I                                                                                                                                            []     Faxed                 to the Health Care Provider on this the      I hereby certify that this First Request and accompanying Form 1010A was                                                       ______  day of   _______ ,   _______                                                                                                                             []      Emailed                  (day)                 (month)         (year)   E  R Signature of [employer]/Self Insured Employer or Utilization Review Company:     P  R  O  V  I  D                                                                                                                                            []     Faxed          to the [employer]/Self Insured Employer on this the                                I hereby certify that a response to the First Request and                                               accompanying Form 1010A was                                                                                 ______  day of   _______ ,   _______                                                                                                                             []      Emailed                  (day)                 (month)         (year)   E  R Signature of Health Care Provider: Printed Name:   SECTION 5. SUSPENSION OF PRIOR AUTHORIZATION DUE TO LACK OF INFORMATION       C   Suspension of Prior Authorization Process due to Lack of Information     A  R           []  The requested Treatment or Testing is delayed due to a Suspension of Prior Authorization Due to  Lack of Information   R  I  E                                                                                                                           []     Faxed                 to the Health Care Provider on this the      I hereby certify that this Suspension of Prior Authorization was                                                       ______  day of   _______ ,   _______                                                                                                                            []      Emailed                  (day)                 (month)         (year)   R Signature of [employer]/Self Insured Employer or Utilization Review Company:   Printed Name:       P    Appeal of Suspension to Medical Services Section by Health Care Provider     R  O  V  I hereby certify that this form and all information previously submitted to [employer] Employer   was faxed to [employer]  (Fax Number: [phone] this ______  day of   _______ ,   _______   I  D  E                                                                                                                           []     Faxed       to the [employer]/Self Insured Employer on this the      I hereby certify that this Appeal of Suspension of Prior Authorization was                                        ______  day of   _______ ,   _______                                                                                                                            []      Emailed                  (day)                 (month)         (year)   R Signature of Health Care Provider:   Printed Name:     SECTION 6. DETERMINATION OF MEDICAL SERVICES SECTION              []  The required information of LAC40:2715(C) was not provided              []  The required information of LAC40:2715(C) was provided   O  FORREST BLANCO                                                                                                                           []      Faxed           to the Health Care Provider  & [employer]Self                                                                                                                                                                       Insured Employer on this the                      I hereby certify that a written determination was                                                                ______  day of   _______ ,   _______                                                                                                                            []      Emailed                  (day)                 (month)         (year)    Signature: Printed Name:     SECTION 7. HEALTH CARE PROVIDER RESPONSE TO MEDICAL SERVICES DETERMINATION   P  R  O  V  I  D                                                                                                                             []     Faxed       to the [employer]/Self Insured Employer on this the   I hereby certify that additional information, pursuant to the determination of                                       Medical Services Section, was                                    []      Emailed              ______  day of   _______ ,   _______                                                                                                                                                                     (day)                 (month)         (year)   E  R Signature of Health Care Provider: Printed Name:

## 2017-07-12 NOTE — PROGRESS NOTES
Physical Therapy Daily Note     Name: John Macedo  Clinic Number: [id number]  Diagnosis:   Encounter Diagnoses   Name Primary?    Chronic bilateral low back pain with right-sided sciatica Yes    Chronic pain of left knee     Decreased ROM of left knee     Difficulty walking     Muscle weakness      Physician: Alvarez Garcia MD  Treatment Orders: PT Eval and Treat  Past Medical History:   Diagnosis Date    Arthritis     GERD (gastroesophageal reflux disease)        Precautions: as per diagnosis TKR L May 2016  Evaluation date: 6/27/2017  Visit # authorized: 2/12 on 7/12/2017  Authorization period: 6-15-18  Plan of care expiration: 8-7-17  MD Follow up appt: Dr. Montero 7-10, none scheduled with Dr. Garcia    Subjective     Pt reports: feeling about the same     Pain Scale: before treatment: 7 in back and knee is 5 currently; after knee to chest and push/pull 5 after treatment: 5 knee and back    Objective     Knee  ROM: (measured in degrees)   Knee (R) (L)   Flexion 130 80   Extension 5 -15     AR R pelvis to start   Pt tends to stand weight shift to L    ROM: 10 -95 to start  After stretching 103 flexion -5 extension, which pt feels is best he usually got  TREATMENT  Therapeutic exercise: John received therapeutic exercises to develop strength, endurance, ROM, flexibility and core stabilization for 25 minutes including:   Single knee to chest  x10  Push/pull x 3  Piriformis stretch x 10  Hamstring stretch supine x 10  Quad stretch x 5 min  José Luis patella taping was performed to the knee for lateral glide and tilt to improve patella tracking to decrease pain with quad contraction and with functional weight bearing activities.  Instructed pt in use, care and precautions with tape.    SLR x 4  X 2/10 with 2#  Hip ext prone with bent knee 2/10 with pillow under stomach  Bridge 2/10  Single knee to chest x 10  Push/pull x 3    Knee extension supine x 5 min 2#    Pt also performed arm and leg lift  prone while waiting with no pillow  Due to continued symptoms of 5./10 in LB after flexion work.  Instructed pt in flexion/extension principles and to avoid any extension for now and focus on flexion so will hold arm and leg lift prone for now    Written Home Exercises Provided: none today  Pt demo good understanding of the education provided. John demonstrated good return demonstration of activities.     Pt. education:  · Posture reeducation, body mechanics, HEP,   · No spiritual or educational barriers to learning provided  · Pt has no cultural, educational or language barriers to learning provided.    Assessment     Pt with min change with just push/pull, so appears to understand use of flexion principles and will monitor response.  Pt may benefit from Ordonez taping again for knee and understands to keep working on maintaining knee extension and watch WB to maintain even WB in standing as much as possible   Pt will continue to benefit from skilled outpatient physical therapy to address the remaining functional deficits, provide pt/family education, and to maximize pt's level of independence in the home and community environment. .     GOALS:   Short Term Goals:  3 weeks  Increase range of motion 25%  Increase strength 1/2 muscle grade  Improve postural awareness of pelvis to independently identify dysfunction with min assist from PT  Be able to perform HEP with minimal cueing required  Improve functional impairment to 30     Long Term Goals: 6 weeks  Increase range of motion to 75% to 100% full   Improve muscle strength 1 muscle grade  Improve muscle strength with MMT to 4+/5 to 5/5  Improve and stabilize proper pelvic positioning  Restore ability to ambulate with near normal gait with pain at worst 3 or below  Walking for ADL  will be restored with pain at worst 3 or below  Restore ability to sit for ADL with pain at worst 3 or below  Restore ability to perform yardwork with pain at worst 3 or  below  Restore ability to perform ADL's and household activities independently and with pain at worst 3 or below  Improve functional impairment to 35    Anticipated barriers to physical therapy: none  Pt's spiritual, cultural and educational needs considered and pt agreeable to plan of care and goals        Plan   Continue with established Plan of Care towards PT goals.

## 2017-07-14 ENCOUNTER — CLINICAL SUPPORT (OUTPATIENT)
Dept: REHABILITATION | Facility: HOSPITAL | Age: 57
End: 2017-07-14
Attending: ORTHOPAEDIC SURGERY
Payer: OTHER MISCELLANEOUS

## 2017-07-14 DIAGNOSIS — M25.662 DECREASED ROM OF LEFT KNEE: ICD-10-CM

## 2017-07-14 DIAGNOSIS — M54.41 CHRONIC BILATERAL LOW BACK PAIN WITH RIGHT-SIDED SCIATICA: Primary | ICD-10-CM

## 2017-07-14 DIAGNOSIS — M25.562 CHRONIC PAIN OF LEFT KNEE: ICD-10-CM

## 2017-07-14 DIAGNOSIS — G89.29 CHRONIC BILATERAL LOW BACK PAIN WITH RIGHT-SIDED SCIATICA: Primary | ICD-10-CM

## 2017-07-14 DIAGNOSIS — G89.29 CHRONIC PAIN OF LEFT KNEE: ICD-10-CM

## 2017-07-14 DIAGNOSIS — R26.2 DIFFICULTY WALKING: ICD-10-CM

## 2017-07-14 DIAGNOSIS — M62.81 MUSCLE WEAKNESS: ICD-10-CM

## 2017-07-14 PROCEDURE — 97140 MANUAL THERAPY 1/> REGIONS: CPT | Performed by: PHYSICAL THERAPIST

## 2017-07-14 PROCEDURE — 97110 THERAPEUTIC EXERCISES: CPT | Performed by: PHYSICAL THERAPIST

## 2017-07-14 PROCEDURE — 97012 MECHANICAL TRACTION THERAPY: CPT | Performed by: PHYSICAL THERAPIST

## 2017-07-14 NOTE — PROGRESS NOTES
Physical Therapy Daily Note     Name: John Macedo  Clinic Number: [id number]  Diagnosis:   Encounter Diagnoses   Name Primary?    Chronic bilateral low back pain with right-sided sciatica Yes    Chronic pain of left knee     Decreased ROM of left knee     Difficulty walking     Muscle weakness      Physician: Alvarez Garcia MD  Treatment Orders: PT Eval and Treat  Past Medical History:   Diagnosis Date    Arthritis     GERD (gastroesophageal reflux disease)        Precautions: as per diagnosis TKR L May 2016  Evaluation date: 6/27/2017  Visit # authorized: 3/12 on 7/14/2017  Authorization period: 6-15-18  Plan of care expiration: 8-7-17  MD Follow up appt: Dr. Montero 7-10, none scheduled with Dr. Garcia    Subjective     Pt reports: feeling about the same     Pain Scale: before treatment: 6 in back and knee is 5 currently; after knee to chest and push/pull 5 after treatment: 5 knee and 4 back    Objective     Knee  ROM: (measured in degrees) initial eval  Knee (R) (L)   Flexion 130 80   Extension 5 -15     AR R pelvis to start   Pt tends to stand weight shift to L    ROM: -5 ext to start  After stretching 103 flexion on 7-12-17  TREATMENT  Therapeutic exercise: John received therapeutic exercises to develop strength, endurance, ROM, flexibility and core stabilization for 15 minutes including:   Single knee to chest  x10  Push/pull x 3  Piriformis stretch x 10  Hamstring stretch supine x 10  Quad stretch x 5 min  Attempted B knee to chest, no improvement    Worked on gait to try and improve tanya.  Pt with long stride on L and lands hard on R at heel strike.  Used metrinome to try and improve gait and instructed to work on softening heel strike R  NP below today, to do at home  SLR x 4  X 2/10 with 2#  Hip ext prone with bent knee 2/10 with pillow under stomach  Bridge 2/10  Single knee to chest x 10  Push/pull x 3    Manual therapy techniques were performed to decrease muscle tightness and  pain to R LE and back  for 25 minutes.  Dry needling consent form was reviewed with the patient addressing all questions and concerns and signed by patient.  Patient also gave verbal consent to undergo dry needling.  Copy of the consent form was not provided to patient as requested by patient. Dry needling with trigger point/manual therapy techniques was performed as per dry needle flow sheet to L4-5 and R gluteals and homeostatic points  All needles were removed and changes in signs and symptoms were noted in dry needle flow sheet.  Dry needling was performed to decrease inflammation, increase circulation, decrease pain and restore homeostasis.      lumbar mechanical traction was performed to decompress spine and decrease muscle tightness and pain.  Traction was performed with a 30 second hold with a load of 75# and 10 second rest with a load of 35# for 10minutes.      Written Home Exercises Provided: none today  Pt demo good understanding of the education provided. John demonstrated good return demonstration of activities.     Pt. education:  · Posture reeducation, body mechanics, HEP,   · No spiritual or educational barriers to learning provided  · Pt has no cultural, educational or language barriers to learning provided.    Assessment   Pt with improved symptoms after traction.  Pt also to bring FES brace and will eval walk with brace, consider using brace more often short periods of time if improved gait in brace     Pt will continue to benefit from skilled outpatient physical therapy to address the remaining functional deficits, provide pt/family education, and to maximize pt's level of independence in the home and community environment. .     GOALS:   Short Term Goals:  3 weeks  Increase range of motion 25%  Increase strength 1/2 muscle grade  Improve postural awareness of pelvis to independently identify dysfunction with min assist from PT  Be able to perform HEP with minimal cueing required  Improve  functional impairment to 30     Long Term Goals: 6 weeks  Increase range of motion to 75% to 100% full   Improve muscle strength 1 muscle grade  Improve muscle strength with MMT to 4+/5 to 5/5  Improve and stabilize proper pelvic positioning  Restore ability to ambulate with near normal gait with pain at worst 3 or below  Walking for ADL  will be restored with pain at worst 3 or below  Restore ability to sit for ADL with pain at worst 3 or below  Restore ability to perform yardwork with pain at worst 3 or below  Restore ability to perform ADL's and household activities independently and with pain at worst 3 or below  Improve functional impairment to 35    Anticipated barriers to physical therapy: none  Pt's spiritual, cultural and educational needs considered and pt agreeable to plan of care and goals        Plan   Continue with established Plan of Care towards PT goals.

## 2017-07-18 ENCOUNTER — CLINICAL SUPPORT (OUTPATIENT)
Dept: REHABILITATION | Facility: HOSPITAL | Age: 57
End: 2017-07-18
Attending: ORTHOPAEDIC SURGERY
Payer: OTHER MISCELLANEOUS

## 2017-07-18 DIAGNOSIS — M62.81 MUSCLE WEAKNESS: ICD-10-CM

## 2017-07-18 DIAGNOSIS — G89.29 CHRONIC BILATERAL LOW BACK PAIN WITH RIGHT-SIDED SCIATICA: Primary | ICD-10-CM

## 2017-07-18 DIAGNOSIS — R26.2 DIFFICULTY WALKING: ICD-10-CM

## 2017-07-18 DIAGNOSIS — G89.29 CHRONIC PAIN OF LEFT KNEE: ICD-10-CM

## 2017-07-18 DIAGNOSIS — M54.41 CHRONIC BILATERAL LOW BACK PAIN WITH RIGHT-SIDED SCIATICA: Primary | ICD-10-CM

## 2017-07-18 DIAGNOSIS — M25.562 CHRONIC PAIN OF LEFT KNEE: ICD-10-CM

## 2017-07-18 DIAGNOSIS — M25.662 DECREASED ROM OF LEFT KNEE: ICD-10-CM

## 2017-07-18 PROCEDURE — 97110 THERAPEUTIC EXERCISES: CPT | Performed by: PHYSICAL THERAPIST

## 2017-07-18 PROCEDURE — 97012 MECHANICAL TRACTION THERAPY: CPT | Performed by: PHYSICAL THERAPIST

## 2017-07-18 NOTE — PROGRESS NOTES
Physical Therapy Daily Note     Name: John Macedo  Clinic Number: [id number]  Diagnosis:   Encounter Diagnoses   Name Primary?    Chronic bilateral low back pain with right-sided sciatica Yes    Chronic pain of left knee     Decreased ROM of left knee     Difficulty walking     Muscle weakness      Physician: Alvarez Garcia MD  Treatment Orders: PT Eval and Treat  Past Medical History:   Diagnosis Date    Arthritis     GERD (gastroesophageal reflux disease)        Precautions: as per diagnosis TKR L May 2016  Evaluation date: 6/27/2017  Visit # authorized: 4/12 on 7/18/2017  Authorization period: 6-15-18  Plan of care expiration: 8-7-17  MD Follow up appt: Dr. Montero 7-10, none scheduled with Dr. Garcia    Subjective     Pt reports: feels better in brace.  Pt states bad night last night, unable to sleep due to back.  Did not get to try inversion table, but will be going this weekend.    Pain Scale: before treatment: 7 in back and knee is 6 currently; after exercise same, after treatment: 6 knee and 5 back    Objective     Knee  ROM: (measured in degrees) 7-18-17 prior to stretching  Knee (L)   Flexion 95   Extension -5         Knee  ROM: (measured in degrees) initial eval  Knee (R) (L)   Flexion 130 80   Extension 5 -15     AR R pelvis to start   Pt tends to stand weight shift to L      TREATMENT  Therapeutic exercise: John received therapeutic exercises to develop strength, endurance, ROM, flexibility and core stabilization for 55 minutes including:   Single knee to chest  x10 opp knee straight  Push/pull x 3  Piriformis stretch x 10  Hamstring stretch supine x 10 opp knee straight    IT band stretch x 10 with knees bent    Hip flexor stretch off edge of mat with opp knee bent foot resting on mat  Quad stretch x 10  Prong hang x 5 min  Single knee to chest x 10 opp knee straight  Push/pull x 3        Worked on gait with PNF at hips and using mirror for visual cueing.   Added hip abd side walk  and monster walk x 2 laps    NP below today, to do at home Instructed pt to continue strengthening 3-5 times a week and to continue with work out at gym  SLR x 4  X 2/10 with 2#  Hip ext prone with bent knee 2/10 with pillow under stomach  Bridge 2/10  Single knee to chest x 10  Push/pull x 3    (NP)Manual therapy techniques were performed to decrease muscle tightness and pain to R LE and back  for 25 minutes.  Dry needling consent form was reviewed with the patient addressing all questions and concerns and signed by patient.  Patient also gave verbal consent to undergo dry needling.  Copy of the consent form was not provided to patient as requested by patient. Dry needling with trigger point/manual therapy techniques was performed as per dry needle flow sheet to L4-5 and R gluteals and homeostatic points  All needles were removed and changes in signs and symptoms were noted in dry needle flow sheet.  Dry needling was performed to decrease inflammation, increase circulation, decrease pain and restore homeostasis.      lumbar mechanical traction was performed to decompress spine and decrease muscle tightness and pain.  Traction was performed with a 30 second hold with a load of 85# and 10 second rest with a load of 45# for 15minutes.      Written Home Exercises Provided: none today  Pt demo good understanding of the education provided. John demonstrated good return demonstration of activities.     Pt. education:  · Posture reeducation, body mechanics, HEP,   · No spiritual or educational barriers to learning provided  · Pt has no cultural, educational or language barriers to learning provided.    Assessment   Pt with improved symptoms after traction.  Pt zonia progression well and understands stretching ex.       Pt will continue to benefit from skilled outpatient physical therapy to address the remaining functional deficits, provide pt/family education, and to maximize pt's level of independence in the home and  community environment. .     GOALS:   Short Term Goals:  3 weeks  Increase range of motion 25%  Increase strength 1/2 muscle grade  Improve postural awareness of pelvis to independently identify dysfunction with min assist from PT  Be able to perform HEP with minimal cueing required  Improve functional impairment to 30     Long Term Goals: 6 weeks  Increase range of motion to 75% to 100% full   Improve muscle strength 1 muscle grade  Improve muscle strength with MMT to 4+/5 to 5/5  Improve and stabilize proper pelvic positioning  Restore ability to ambulate with near normal gait with pain at worst 3 or below  Walking for ADL  will be restored with pain at worst 3 or below  Restore ability to sit for ADL with pain at worst 3 or below  Restore ability to perform yardwork with pain at worst 3 or below  Restore ability to perform ADL's and household activities independently and with pain at worst 3 or below  Improve functional impairment to 35    Anticipated barriers to physical therapy: none  Pt's spiritual, cultural and educational needs considered and pt agreeable to plan of care and goals        Plan   Continue with established Plan of Care towards PT goals.

## 2017-07-20 ENCOUNTER — CLINICAL SUPPORT (OUTPATIENT)
Dept: REHABILITATION | Facility: HOSPITAL | Age: 57
End: 2017-07-20
Attending: ORTHOPAEDIC SURGERY
Payer: OTHER MISCELLANEOUS

## 2017-07-20 DIAGNOSIS — M54.41 CHRONIC BILATERAL LOW BACK PAIN WITH RIGHT-SIDED SCIATICA: Primary | ICD-10-CM

## 2017-07-20 DIAGNOSIS — G89.29 CHRONIC PAIN OF LEFT KNEE: ICD-10-CM

## 2017-07-20 DIAGNOSIS — M25.562 CHRONIC PAIN OF LEFT KNEE: ICD-10-CM

## 2017-07-20 DIAGNOSIS — G89.29 CHRONIC BILATERAL LOW BACK PAIN WITH RIGHT-SIDED SCIATICA: Primary | ICD-10-CM

## 2017-07-20 DIAGNOSIS — R26.2 DIFFICULTY WALKING: ICD-10-CM

## 2017-07-20 DIAGNOSIS — M25.662 DECREASED ROM OF LEFT KNEE: ICD-10-CM

## 2017-07-20 DIAGNOSIS — M62.81 MUSCLE WEAKNESS: ICD-10-CM

## 2017-07-20 PROCEDURE — 97110 THERAPEUTIC EXERCISES: CPT | Performed by: PHYSICAL THERAPIST

## 2017-07-20 PROCEDURE — 97140 MANUAL THERAPY 1/> REGIONS: CPT | Performed by: PHYSICAL THERAPIST

## 2017-07-20 NOTE — PROGRESS NOTES
Physical Therapy Daily Note     Name: John Macedo  Clinic Number: [id number]  Diagnosis:   Encounter Diagnoses   Name Primary?    Chronic bilateral low back pain with right-sided sciatica Yes    Chronic pain of left knee     Decreased ROM of left knee     Difficulty walking     Muscle weakness      Physician: Alvarez Garcia MD  Treatment Orders: PT Eval and Treat  Past Medical History:   Diagnosis Date    Arthritis     GERD (gastroesophageal reflux disease)        Precautions: as per diagnosis TKR L May 2016  Evaluation date: 6/27/2017  Visit # authorized: 5/12 on 7/20/2017  Authorization period: 6-15-18  Plan of care expiration: 8-7-17  MD Follow up appt: Dr. Montero 7-10, none scheduled with Dr. Garcia    Subjective     Pt reports: feels better in brace.  Pt states bad night last night, unable to sleep due to back.  Did not get to try inversion table, but will be going this weekend.    Pain Scale: before treatment: 5 in back and knee is 6 currently; after push/pull and knee to chest 4/10after exercise same, after treatment: 3-4 knee and 4 back    Objective     PT reviewed FOTO scores for John Macedo on 7-20-17  FOTO score: 39 at IE 26    Knee  ROM: (measured in degrees) 7-20-17 after treatment  Knee (L)   Flexion 105   Extension -1             Knee  ROM: (measured in degrees) 7-18-17 prior to stretching  Knee (L)   Flexion 95   Extension -5         Knee  ROM: (measured in degrees) initial eval  Knee (R) (L)   Flexion 130 80   Extension 5 -15     AR R pelvis to start         TREATMENT  Therapeutic exercise: John received therapeutic exercises to develop strength, endurance, ROM, flexibility and core stabilization for 40 minutes including:   Single knee to chest  x10 opp knee straight  Push/pull x 3  Quad stretch with strap 5 min  Heel slides with strap x 5 min  Piriformis stretch x 10  Hamstring stretch supine x 10 opp knee straight   IT band stretch x 10 with knees bent   Hip flexor  stretch off edge of mat with opp knee bent foot resting on mat  Quad stretch x 10  Prong hang x 5 min with 2#  Single knee to chest x 10 opp knee straight  Push/pull x 3        Worked on gait after stretching to achieve full knee extension at heel strike  Added hip abd side walk and monster walk x 2 laps    NP below today, to do at home Instructed pt to continue strengthening 3-5 times a week and to continue with work out at gym  SLR x 4  X 2/10 with 2#  Hip ext prone with bent knee 2/10 with pillow under stomach  Bridge 2/10  Single knee to chest x 10  Push/pull x 3    Manual therapy techniques were performed to decrease muscle tightness and pain to R LE and back  for 45 minutes. Pt received tool-assisted massage with manual therapy techniques to ant thigh and knee and around patella and HS and gastroc prone to trigger an inflammatory healing response and stimulate the production of new collagen and proper, more functional, less painful healing.  Vacuum/cupping STM with manual therapy techniques was performed to  ant thigh and kneeand around patella  and HS and gastroc prone to decrease muscle tightness, increase circulation and promote healing process.  The pt's skin was monitored for redness adjusting pressure as needed. The pt was instructed in possible side effects of bruising and/or soreness.          (NP) due to pt time constraints lumbar mechanical traction was performed to decompress spine and decrease muscle tightness and pain.  Traction was performed with a 30 second hold with a load of 85# and 10 second rest with a load of 45# for 15minutes.      Written Home Exercises Provided: none today  Pt demo good understanding of the education provided. John demonstrated good return demonstration of activities.     Pt. education:  · Posture reeducation, body mechanics, HEP,   · No spiritual or educational barriers to learning provided  · Pt has no cultural, educational or language barriers to learning  provided.    Assessment   Pt with improved ROM and able to fully extend knee in mid stance, reporting less pain Pt on time constraints and unable to perform traction, but to work to get inversion table to try. Improved FOTO     Pt will continue to benefit from skilled outpatient physical therapy to address the remaining functional deficits, provide pt/family education, and to maximize pt's level of independence in the home and community environment. .     GOALS:   Short Term Goals:  3 weeks  Increase range of motion 25%  Increase strength 1/2 muscle grade  Improve postural awareness of pelvis to independently identify dysfunction with min assist from PT  Be able to perform HEP with minimal cueing required  Improve functional impairment to 30     Long Term Goals: 6 weeks  Increase range of motion to 75% to 100% full   Improve muscle strength 1 muscle grade  Improve muscle strength with MMT to 4+/5 to 5/5  Improve and stabilize proper pelvic positioning  Restore ability to ambulate with near normal gait with pain at worst 3 or below  Walking for ADL  will be restored with pain at worst 3 or below  Restore ability to sit for ADL with pain at worst 3 or below  Restore ability to perform yardwork with pain at worst 3 or below  Restore ability to perform ADL's and household activities independently and with pain at worst 3 or below  Improve functional impairment to 35    Anticipated barriers to physical therapy: none  Pt's spiritual, cultural and educational needs considered and pt agreeable to plan of care and goals        Plan   Continue with established Plan of Care towards PT goals.

## 2017-07-25 ENCOUNTER — PATIENT MESSAGE (OUTPATIENT)
Dept: SPINE | Facility: CLINIC | Age: 57
End: 2017-07-25

## 2017-07-25 ENCOUNTER — CLINICAL SUPPORT (OUTPATIENT)
Dept: REHABILITATION | Facility: HOSPITAL | Age: 57
End: 2017-07-25
Attending: ORTHOPAEDIC SURGERY
Payer: OTHER MISCELLANEOUS

## 2017-07-25 DIAGNOSIS — M25.562 CHRONIC PAIN OF LEFT KNEE: ICD-10-CM

## 2017-07-25 DIAGNOSIS — M25.662 DECREASED ROM OF LEFT KNEE: ICD-10-CM

## 2017-07-25 DIAGNOSIS — M54.41 CHRONIC BILATERAL LOW BACK PAIN WITH RIGHT-SIDED SCIATICA: Primary | ICD-10-CM

## 2017-07-25 DIAGNOSIS — G89.29 CHRONIC BILATERAL LOW BACK PAIN WITH RIGHT-SIDED SCIATICA: Primary | ICD-10-CM

## 2017-07-25 DIAGNOSIS — G89.29 CHRONIC PAIN OF LEFT KNEE: ICD-10-CM

## 2017-07-25 DIAGNOSIS — R26.2 DIFFICULTY WALKING: ICD-10-CM

## 2017-07-25 DIAGNOSIS — M62.81 MUSCLE WEAKNESS: ICD-10-CM

## 2017-07-25 PROCEDURE — 97110 THERAPEUTIC EXERCISES: CPT | Performed by: PHYSICAL THERAPIST

## 2017-07-25 PROCEDURE — 97140 MANUAL THERAPY 1/> REGIONS: CPT | Performed by: PHYSICAL THERAPIST

## 2017-07-25 PROCEDURE — 97012 MECHANICAL TRACTION THERAPY: CPT | Performed by: PHYSICAL THERAPIST

## 2017-07-25 NOTE — PROGRESS NOTES
Physical Therapy Daily Note     Name: John Macedo  Clinic Number: [id number]  Diagnosis:   Encounter Diagnoses   Name Primary?    Chronic bilateral low back pain with right-sided sciatica Yes    Chronic pain of left knee     Decreased ROM of left knee     Difficulty walking     Muscle weakness      Physician: Alvarez Garcia MD  Treatment Orders: PT Eval and Treat  Past Medical History:   Diagnosis Date    Arthritis     GERD (gastroesophageal reflux disease)        Precautions: as per diagnosis TKR L May 2016  Evaluation date: 6/27/2017  Visit # authorized: 6/12 on 7/25/2017  Authorization period: 6-15-18  Plan of care expiration: 8-7-17  MD Follow up appt: Dr. Montero 7-10, none scheduled with Dr. Garcia    Subjective     Pt reports:best weekend yet, last treatment with work around patella helped.  Pt got inversion table and feels this may have also helped back    Pain Scale: before treatment: 5 in back and knee is 2 currently; after push/pull and knee to chest 4.5/10 back after treatment: 1 knee and 4 back    Objective     PT reviewed FOTO scores for John Macedo on 7-20-17  FOTO score: 39 at IE 26    Knee  ROM: (measured in degrees) 7-25-17 after treatment  Knee (L)   Flexion 100   Extension 0             Knee  ROM: (measured in degrees) 7-25-17 prior to stretching  Knee (L)   Flexion 90   Extension -5         Knee  ROM: (measured in degrees) initial eval  Knee (R) (L)   Flexion 130 80   Extension 5 -15     AR R pelvis to start         TREATMENT  Therapeutic exercise: John received therapeutic exercises to develop strength, endurance, ROM, flexibility and core stabilization for 25 minutes including:   Pt with pelvic dysfunction.    Pt performed push/pull exercise and able to note positive change in symptoms.  Instructed pt further in increased awareness of symptoms.  Instructed pt again in need to perform push/pull at onset of increased symptoms.    Single knee to chest  x10 opp knee  straight  Push/pull x 3  Quad stretch with strap 5 min  Heel slides with strap x 5 min  Piriformis stretch x 10  Hamstring stretch supine x 10 opp knee straight   IT band stretch x 10 with knees bent   Hip flexor stretch off edge of mat with opp knee bent foot resting on mat  Quad stretch x 10  Prong hang x 5 min with 2#  Single knee to chest x 10 opp knee straight  Push/pull x 3        Worked on gait after stretching to achieve full knee extension at heel strike  Added hip abd side walk and monster walk x 2 laps    NP below today, to do at home Instructed pt to continue strengthening 3-5 times a week and to continue with work out at gym  SLR x 4  X 2/10 with 2#  Hip ext prone with bent knee 2/10 with pillow under stomach  Bridge 2/10  Single knee to chest x 10  Push/pull x 3    Manual therapy techniques were performed to decrease muscle tightness and pain to R LE and back  for 30 minutes. Pt received tool-assisted massage with manual therapy techniques to ant thigh and knee and around patella and HS and gastroc prone to trigger an inflammatory healing response and stimulate the production of new collagen and proper, more functional, less painful healing.  Vacuum/cupping STM with manual therapy techniques was performed to  ant thigh and kneeand around patella  and HS and gastroc prone to decrease muscle tightness, increase circulation and promote healing process.  The pt's skin was monitored for redness adjusting pressure as needed. The pt was instructed in possible side effects of bruising and/or soreness.           lumbar mechanical traction was performed to decompress spine and decrease muscle tightness and pain.  Traction was performed with a 30 second hold with a load of 95# and 10 second rest with a load of 45# for 15minutes.      Written Home Exercises Provided: none today  Pt demo good understanding of the education provided. John demonstrated good return demonstration of activities.     Pt.  education:  · Posture reeducation, body mechanics, HEP,   · No spiritual or educational barriers to learning provided  · Pt has no cultural, educational or language barriers to learning provided.    Assessment   Pt with improved ROM and able to fully extend knee in mid stance, reporting less pain Pt with decreased pain after traction   Pt will continue to benefit from skilled outpatient physical therapy to address the remaining functional deficits, provide pt/family education, and to maximize pt's level of independence in the home and community environment. .     GOALS:   Short Term Goals:  3 weeks  Increase range of motion 25%  Increase strength 1/2 muscle grade  Improve postural awareness of pelvis to independently identify dysfunction with min assist from PT  Be able to perform HEP with minimal cueing required  Improve functional impairment to 30     Long Term Goals: 6 weeks  Increase range of motion to 75% to 100% full   Improve muscle strength 1 muscle grade  Improve muscle strength with MMT to 4+/5 to 5/5  Improve and stabilize proper pelvic positioning  Restore ability to ambulate with near normal gait with pain at worst 3 or below  Walking for ADL  will be restored with pain at worst 3 or below  Restore ability to sit for ADL with pain at worst 3 or below  Restore ability to perform yardwork with pain at worst 3 or below  Restore ability to perform ADL's and household activities independently and with pain at worst 3 or below  Improve functional impairment to 35    Anticipated barriers to physical therapy: none  Pt's spiritual, cultural and educational needs considered and pt agreeable to plan of care and goals        Plan   Continue with established Plan of Care towards PT goals.

## 2017-07-27 ENCOUNTER — CLINICAL SUPPORT (OUTPATIENT)
Dept: REHABILITATION | Facility: HOSPITAL | Age: 57
End: 2017-07-27
Attending: ORTHOPAEDIC SURGERY
Payer: OTHER MISCELLANEOUS

## 2017-07-27 DIAGNOSIS — M25.562 CHRONIC PAIN OF LEFT KNEE: ICD-10-CM

## 2017-07-27 DIAGNOSIS — M25.662 DECREASED ROM OF LEFT KNEE: ICD-10-CM

## 2017-07-27 DIAGNOSIS — M62.81 MUSCLE WEAKNESS: ICD-10-CM

## 2017-07-27 DIAGNOSIS — G89.29 CHRONIC PAIN OF LEFT KNEE: ICD-10-CM

## 2017-07-27 DIAGNOSIS — G89.29 CHRONIC BILATERAL LOW BACK PAIN WITH RIGHT-SIDED SCIATICA: Primary | ICD-10-CM

## 2017-07-27 DIAGNOSIS — M54.41 CHRONIC BILATERAL LOW BACK PAIN WITH RIGHT-SIDED SCIATICA: Primary | ICD-10-CM

## 2017-07-27 DIAGNOSIS — R26.2 DIFFICULTY WALKING: ICD-10-CM

## 2017-07-27 PROCEDURE — 97140 MANUAL THERAPY 1/> REGIONS: CPT | Performed by: PHYSICAL THERAPIST

## 2017-07-27 PROCEDURE — 97010 HOT OR COLD PACKS THERAPY: CPT | Performed by: PHYSICAL THERAPIST

## 2017-07-27 PROCEDURE — 97012 MECHANICAL TRACTION THERAPY: CPT | Performed by: PHYSICAL THERAPIST

## 2017-07-27 PROCEDURE — 97014 ELECTRIC STIMULATION THERAPY: CPT | Performed by: PHYSICAL THERAPIST

## 2017-07-27 PROCEDURE — 97110 THERAPEUTIC EXERCISES: CPT | Performed by: PHYSICAL THERAPIST

## 2017-07-27 NOTE — PROGRESS NOTES
Physical Therapy Daily Note     Name: John Macedo  Clinic Number: [id number]  Diagnosis:   Encounter Diagnoses   Name Primary?    Chronic bilateral low back pain with right-sided sciatica Yes    Chronic pain of left knee     Decreased ROM of left knee     Difficulty walking     Muscle weakness      Physician: Alvarez Garcia MD  Treatment Orders: PT Eval and Treat  Past Medical History:   Diagnosis Date    Arthritis     GERD (gastroesophageal reflux disease)        Precautions: as per diagnosis TKR L May 2016  Evaluation date: 6/27/2017  Visit # authorized: 7/12 on 7/27/2017  Authorization period: 6-15-18  Plan of care expiration: 8-7-17  MD Follow up appt: Dr. Montero 7-10, none scheduled with Dr. Garcia    Subjective     Pt reports:did 4 hours of yardwork and near end felt a little pain, woke up in night with pain    Pain Scale: before treatment: 8 in back and knee is 3 currently;  after treatment: 3 knee and 6 back    Objective     PT reviewed FOTO scores for John Macedo on 7-20-17  FOTO score: 39 at IE 26    Knee  ROM: (measured in degrees) 7-25-17 after treatment  Knee (L)   Flexion 100   Extension 0             Knee  ROM: (measured in degrees) 7-25-17 prior to stretching  Knee (L)   Flexion 90   Extension -5         Knee  ROM: (measured in degrees) initial eval  Knee (R) (L)   Flexion 130 80   Extension 5 -15     AR R pelvis to start         TREATMENT  Therapeutic exercise: John received therapeutic exercises to develop strength, endurance, ROM, flexibility and core stabilization for 15 minutes including:   Pt with pelvic dysfunction.    Pt performed push/pull exercise and able to note positive change in symptoms.  Instructed pt further in increased awareness of symptoms.  Instructed pt again in need to perform push/pull at onset of increased symptoms.    Single knee to chest  x10 opp knee straight  Push/pull x 3  Quad stretch with strap 5 min  Heel slides with strap x 5  min  Piriformis stretch x 10  Hamstring stretch supine x 10 opp knee straight   IT band stretch x 10 with knees bent   Hip flexor stretch off edge of mat with opp knee bent foot resting on mat  Quad stretch x 10  Prong hang x 5 min with 2#  Single knee to chest x 10 opp knee straight  Push/pull x 3        Worked on gait after stretching to achieve full knee extension at heel strike  Added hip abd side walk and monster walk x 2 laps    NP below today, to do at home Instructed pt to continue strengthening 3-5 times a week and to continue with work out at gym  SLR x 4  X 2/10 with 2#  Hip ext prone with bent knee 2/10 with pillow under stomach  Bridge 2/10  Single knee to chest x 10  Push/pull x 3    Instructed pt further in pacing himself rob with new activities.    Manual therapy techniques were performed to decrease muscle tightness and pain to R LE and back  for 15 minutes. Vacuum/cupping STM with manual therapy techniques was performed to  Low back prone to decrease muscle tightness, increase circulation and promote healing process.  The pt's skin was monitored for redness adjusting pressure as needed. The pt was instructed in possible side effects of bruising and/or soreness.      lumbar mechanical traction was performed to decompress spine and decrease muscle tightness and pain.  Traction was performed with a 30 second hold with a load of 100# and 10 second rest with a load of 50# for 15minutes.      Patient received pre-mod electrical stimulation to decrease muscle tightness and pain to R lumbar paraspinals and R gluteals for 15 minutes with MH with cycle time: continuous, beat frequency: , CC/CV: CV.     Written Home Exercises Provided: none today  Pt demo good understanding of the education provided. John demonstrated good return demonstration of activities.     Pt. education:  · Posture reeducation, body mechanics, HEP,   · No spiritual or educational barriers to learning provided  · Pt has no  cultural, educational or language barriers to learning provided.    Assessment   Pt understands that he overdid and should have only done yardwork for 30 min max to start.  Pt will need time to recover from overdoing   Pt will continue to benefit from skilled outpatient physical therapy to address the remaining functional deficits, provide pt/family education, and to maximize pt's level of independence in the home and community environment. .     GOALS:   Short Term Goals:  3 weeks  Increase range of motion 25%  Increase strength 1/2 muscle grade  Improve postural awareness of pelvis to independently identify dysfunction with min assist from PT  Be able to perform HEP with minimal cueing required  Improve functional impairment to 30     Long Term Goals: 6 weeks  Increase range of motion to 75% to 100% full   Improve muscle strength 1 muscle grade  Improve muscle strength with MMT to 4+/5 to 5/5  Improve and stabilize proper pelvic positioning  Restore ability to ambulate with near normal gait with pain at worst 3 or below  Walking for ADL  will be restored with pain at worst 3 or below  Restore ability to sit for ADL with pain at worst 3 or below  Restore ability to perform yardwork with pain at worst 3 or below  Restore ability to perform ADL's and household activities independently and with pain at worst 3 or below  Improve functional impairment to 35    Anticipated barriers to physical therapy: none  Pt's spiritual, cultural and educational needs considered and pt agreeable to plan of care and goals        Plan   Continue with established Plan of Care towards PT goals.

## 2017-07-27 NOTE — ADDENDUM NOTE
Encounter addended by: Sydney Montoya on: 7/27/2017 12:29 PM<BR>    Actions taken: Letter status changed

## 2017-08-01 ENCOUNTER — PATIENT MESSAGE (OUTPATIENT)
Dept: SPINE | Facility: CLINIC | Age: 57
End: 2017-08-01

## 2017-08-08 ENCOUNTER — CLINICAL SUPPORT (OUTPATIENT)
Dept: REHABILITATION | Facility: HOSPITAL | Age: 57
End: 2017-08-08
Attending: ORTHOPAEDIC SURGERY
Payer: OTHER MISCELLANEOUS

## 2017-08-08 DIAGNOSIS — R26.2 DIFFICULTY WALKING: ICD-10-CM

## 2017-08-08 DIAGNOSIS — G89.29 CHRONIC PAIN OF LEFT KNEE: ICD-10-CM

## 2017-08-08 DIAGNOSIS — M25.562 CHRONIC PAIN OF LEFT KNEE: ICD-10-CM

## 2017-08-08 DIAGNOSIS — M54.41 CHRONIC BILATERAL LOW BACK PAIN WITH RIGHT-SIDED SCIATICA: Primary | ICD-10-CM

## 2017-08-08 DIAGNOSIS — G89.29 CHRONIC BILATERAL LOW BACK PAIN WITH RIGHT-SIDED SCIATICA: Primary | ICD-10-CM

## 2017-08-08 DIAGNOSIS — M62.81 MUSCLE WEAKNESS: ICD-10-CM

## 2017-08-08 DIAGNOSIS — M25.662 DECREASED ROM OF LEFT KNEE: ICD-10-CM

## 2017-08-08 PROCEDURE — 97140 MANUAL THERAPY 1/> REGIONS: CPT | Performed by: PHYSICAL THERAPIST

## 2017-08-08 PROCEDURE — 97110 THERAPEUTIC EXERCISES: CPT | Performed by: PHYSICAL THERAPIST

## 2017-08-08 NOTE — PROGRESS NOTES
Physical Therapy Daily Note     Name: John Macedo  Clinic Number: [id number]  Diagnosis:   Encounter Diagnoses   Name Primary?    Chronic bilateral low back pain with right-sided sciatica Yes    Chronic pain of left knee     Decreased ROM of left knee     Difficulty walking     Muscle weakness      Physician: Alvarez Garcia MD  Treatment Orders: PT Eval and Treat  Past Medical History:   Diagnosis Date    Arthritis     GERD (gastroesophageal reflux disease)        Precautions: as per diagnosis TKR L May 2016  Evaluation date: 6/27/2017  Visit # authorized: 8/12 on 8/8/2017  Authorization period: 6-15-18  Plan of care expiration: 8-7-17  MD Follow up appt: Dr. Montero 7-10, none scheduled with Dr. Garcia    Subjective     Pt reports: managing symptoms fairly well.  Knee feeling a little stronger.  Getting injection Thurs in back.  Had one before which did help  Pt states he feels the knee pain is manageable, would like to feel he could use leg more for limited with prolonged walking and lacks strength with any work involving legs.    Pain Scale: before treatment: 6 in back and knee is 3 currently;  after treatment: 3 knee but feels less stiff    Objective     PT reviewed FOTO scores for John Macedo on 7-20-17  FOTO score: 39 at IE 26    Knee  ROM: (measured in degrees) 8-8-17 before treatment  Knee (L)   Flexion 100   Extension -3         Knee  ROM: (measured in degrees) 8-8-17 after treatment  Knee (L)   Flexion 105   Extension 0         Knee  ROM: (measured in degrees) initial eval  Knee (R) (L)   Flexion 130 80   Extension 5 -15     AR R pelvis to start         TREATMENT  Therapeutic exercise: John received therapeutic exercises to develop strength, endurance, ROM, flexibility and core stabilization for 15 minutes including:   Pt with pelvic dysfunction.    Pt performed push/pull exercise and able to note positive change in symptoms.  Instructed pt further in increased awareness of  symptoms.  Instructed pt again in need to perform push/pull at onset of increased symptoms.    Single knee to chest  x10 opp knee straight  Push/pull x 3  Quad stretch with strap 5 min  Heel slides with strap x 5 min  Piriformis stretch x 10  Hamstring stretch supine x 10 opp knee straight   IT band stretch x 10 with knees bent   Hip flexor stretch off edge of mat with opp knee bent foot resting on mat  Quad stretch x 10  Prong hang x 5 min with 2#(NP)  Single knee to chest x 10 opp knee straight  Push/pull x 3      NP below today, to do at home Instructed pt to continue strengthening 3-5 times a week and to continue with work out at gym  SLR x 4  X 2/10 with 2#  Hip ext prone with bent knee 2/10 with pillow under stomach  Bridge 2/10  Single knee to chest x 10  Push/pull x 3      Manual therapy techniques were performed to decrease muscle tightness and pain to R LE  for 40 minutes. Pt received tool-assisted massage with manual therapy techniques to R IT band and knee to trigger an inflammatory healing response and stimulate the production of new collagen and proper, more functional, less painful healing.    Vacuum/cupping STM with manual therapy techniques was performed to knee IT band and quad to decrease muscle tightness, increase circulation and promote healing process.  The pt's skin was monitored for redness adjusting pressure as needed. The pt was instructed in possible side effects of bruising and/or soreness.    Dry needling consent form was reviewed with the patient addressing all questions and concerns and signed by patient.  Patient also gave verbal consent to undergo dry needling.  Copy of the consent form was not provided to patient as requested by patient.  Dry needling with trigger point/manual therapy techniques was performed as per dry needle flow sheet to R LE and knee supine.  All needles were removed and changes in signs and symptoms were noted in dry needle flow sheet feeling less stiff same  pain level.  Dry needling was performed to decrease inflammation, increase circulation, decrease pain and restore homeostasis.      (NP)lumbar mechanical traction was performed to decompress spine and decrease muscle tightness and pain.  Traction was performed with a 30 second hold with a load of 100# and 10 second rest with a load of 50# for 15minutes.      (NP)Patient received pre-mod electrical stimulation to decrease muscle tightness and pain to R lumbar paraspinals and R gluteals for 15 minutes with MH with cycle time: continuous, beat frequency: , CC/CV: CV.     Written Home Exercises Provided: none today  Pt demo good understanding of the education provided. John demonstrated good return demonstration of activities.     Pt. education:  · Posture reeducation, body mechanics, HEP,   · No spiritual or educational barriers to learning provided  · Pt has no cultural, educational or language barriers to learning provided.    Assessment   Pt with decreased knee symptoms after manual therapy work and further decrease in stiffness with dry needling. Improved ROM after treatment   Pt will continue to benefit from skilled outpatient physical therapy to address the remaining functional deficits, provide pt/family education, and to maximize pt's level of independence in the home and community environment. .     GOALS:   Short Term Goals:  3 weeks  Increase range of motion 25%  Increase strength 1/2 muscle grade  Improve postural awareness of pelvis to independently identify dysfunction with min assist from PT  Be able to perform HEP with minimal cueing required  Improve functional impairment to 30     Long Term Goals: 6 weeks  Increase range of motion to 75% to 100% full   Improve muscle strength 1 muscle grade  Improve muscle strength with MMT to 4+/5 to 5/5  Improve and stabilize proper pelvic positioning  Restore ability to ambulate with near normal gait with pain at worst 3 or below  Walking for ADL  will be  restored with pain at worst 3 or below  Restore ability to sit for ADL with pain at worst 3 or below  Restore ability to perform yardwork with pain at worst 3 or below  Restore ability to perform ADL's and household activities independently and with pain at worst 3 or below  Improve functional impairment to 35    Anticipated barriers to physical therapy: none  Pt's spiritual, cultural and educational needs considered and pt agreeable to plan of care and goals        Plan   Continue with established Plan of Care towards PT goals.

## 2017-08-10 ENCOUNTER — SURGERY (OUTPATIENT)
Age: 57
End: 2017-08-10

## 2017-08-10 ENCOUNTER — HOSPITAL ENCOUNTER (OUTPATIENT)
Facility: OTHER | Age: 57
Discharge: HOME | End: 2017-08-10
Attending: ANESTHESIOLOGY | Admitting: ANESTHESIOLOGY
Payer: OTHER MISCELLANEOUS

## 2017-08-10 VITALS
RESPIRATION RATE: 17 BRPM | BODY MASS INDEX: 37.89 KG/M2 | HEIGHT: 68 IN | WEIGHT: 250 LBS | TEMPERATURE: 99 F | DIASTOLIC BLOOD PRESSURE: 96 MMHG | HEART RATE: 73 BPM | SYSTOLIC BLOOD PRESSURE: 160 MMHG | OXYGEN SATURATION: 96 %

## 2017-08-10 DIAGNOSIS — G89.29 CHRONIC PAIN: ICD-10-CM

## 2017-08-10 DIAGNOSIS — M53.3 SACROILIAC PAIN: Primary | ICD-10-CM

## 2017-08-10 PROCEDURE — 25500020 PHARM REV CODE 255: Performed by: ANESTHESIOLOGY

## 2017-08-10 PROCEDURE — 63600175 PHARM REV CODE 636 W HCPCS: Performed by: ANESTHESIOLOGY

## 2017-08-10 PROCEDURE — 25000003 PHARM REV CODE 250: Performed by: ANESTHESIOLOGY

## 2017-08-10 PROCEDURE — 27096 INJECT SACROILIAC JOINT: CPT | Mod: RT,,, | Performed by: ANESTHESIOLOGY

## 2017-08-10 PROCEDURE — 27096 INJECT SACROILIAC JOINT: CPT | Performed by: ANESTHESIOLOGY

## 2017-08-10 PROCEDURE — G0260 INJ FOR SACROILIAC JT ANESTH: HCPCS | Performed by: ANESTHESIOLOGY

## 2017-08-10 RX ORDER — BUPIVACAINE HYDROCHLORIDE 2.5 MG/ML
INJECTION, SOLUTION EPIDURAL; INFILTRATION; INTRACAUDAL
Status: DISCONTINUED | OUTPATIENT
Start: 2017-08-10 | End: 2017-08-10 | Stop reason: HOSPADM

## 2017-08-10 RX ORDER — SODIUM CHLORIDE 9 MG/ML
500 INJECTION, SOLUTION INTRAVENOUS CONTINUOUS
Status: DISCONTINUED | OUTPATIENT
Start: 2017-08-10 | End: 2017-08-10 | Stop reason: HOSPADM

## 2017-08-10 RX ORDER — LIDOCAINE HYDROCHLORIDE 10 MG/ML
INJECTION INFILTRATION; PERINEURAL
Status: DISCONTINUED | OUTPATIENT
Start: 2017-08-10 | End: 2017-08-10 | Stop reason: HOSPADM

## 2017-08-10 RX ORDER — TRIAMCINOLONE ACETONIDE 40 MG/ML
INJECTION, SUSPENSION INTRA-ARTICULAR; INTRAMUSCULAR
Status: DISCONTINUED | OUTPATIENT
Start: 2017-08-10 | End: 2017-08-10 | Stop reason: HOSPADM

## 2017-08-10 RX ADMIN — IOHEXOL 10 ML: 300 INJECTION, SOLUTION INTRAVENOUS at 10:08

## 2017-08-10 RX ADMIN — TRIAMCINOLONE ACETONIDE 40 MG: 40 INJECTION, SUSPENSION INTRA-ARTICULAR; INTRAMUSCULAR at 10:08

## 2017-08-10 RX ADMIN — BUPIVACAINE HYDROCHLORIDE 10 ML: 2.5 INJECTION, SOLUTION EPIDURAL; INFILTRATION; INTRACAUDAL; PERINEURAL at 10:08

## 2017-08-10 RX ADMIN — LIDOCAINE HYDROCHLORIDE 10 ML: 10 INJECTION, SOLUTION INFILTRATION; PERINEURAL at 10:08

## 2017-08-10 RX ADMIN — SODIUM BICARBONATE 10 ML: 0.2 INJECTION, SOLUTION INTRAVENOUS at 10:08

## 2017-08-10 NOTE — DISCHARGE INSTRUCTIONS
Home Care Instructions Pain Management:    1. DIET:   You may resume your normal diet today.   2. BATHING:   You may shower with luke warm water. No tub baths or anything that will soak injection sites under water for 24 hours.  3. DRESSING:   You may remove your bandage today.   4. ACTIVITY LEVEL:   You may resume your normal activities 24 hrs after your procedure. Nothing strenuous today.  5. MEDICATIONS:   You may resume your normal medications today. To restart blood thinners, ask your doctor.  6. SPECIAL INSTRUCTIONS:   No heat to the injection site for 24 hrs including, bath or shower, heating pad, moist heat, or hot tubs.    Use ice pack to injection site for any pain or discomfort.  Apply ice packs for 20 minute intervals as needed.     If you have received any sedatives by mouth today you may not drive for 12 hours.    If you have received any sedation through your IV, you may not drive for 24 hrs.     PLEASE CALL YOUR DOCTOR IF:  1. Redness or swelling around the injection site.  2. Fever of 101 degrees or more  3. Drainage (pus) from the injection site.  4. For any continuous bleeding (some dried blood over the incision is normal.)    FOR EMERGENCIES:   If any unusual problems or difficulties occur during clinic hours, call [phone] or [phone].

## 2017-08-10 NOTE — OP NOTE
Sacroiliac Joint Injection under Fluoroscopy  Time-out taken to identify patient and procedure side prior to starting the procedure.   I attest that I have reviewed the patient's home medications prior to the procedure and no contraindication have been identified. I  re-evaluated the patient after the patient was positioned for the procedure in the procedure room immediately before the procedural time-out. The vital signs are current and represent the current state of the patient which has not significantly changed since the preprocedure assessment.               Date of Service: 08/10/2017    PCP: Primary Doctor No      PROCEDURE:  Right sacroiliac joint injection under fluoroscopy.    REASON FOR PROCEDURE: right sacroiliac joint Chronic right SI joint pain [M53.3, G89.29]    PHYSICIAN: Leodan Pate MD  ASSISTANTS: Antonieta Peace MD    MEDICATIONS INJECTED:  Kenalog 20mg and Bupivacaine 0.25% (1.5ml of bupivicaine per side).    LOCAL ANESTHETIC USED: Xylocaine 1% 9ml with Sodium Bicarbonate 1ml. 3ml per site.  SEDATION MEDICATIONS: None    ESTIMATED BLOOD LOSS:  None.    COMPLICATIONS:  None.    TECHNIQUE:   Laying in the prone position, the patient was prepped and draped in the usual sterile fashion using ChloraPrep and fenestrated drape.  The area was determined under fluoroscopy.  Local Xylocaine was injected by raising a wheel and going down to the periosteum using a 27-gauge hypodermic needle.  The 3.5 inch 22-gauge spinal needle was introduce into the right sacroiliac joint.  Negative pressure applied to confirm no intravascular placement.  Omnipaque was injected to confirm placement and to confirm that there was no vascular runoff.  The medication was then injected slowly.  The patient tolerated the procedure well.    PAIN BEFORE THE PROCEDURE:  6/10.    PAIN AFTER THE PROCEDURE: 0/10.    The patient was monitored for approximately 30 minutes after the procedure.  Patient was given post procedure and  discharge instructions to follow at home.  We will see the patient back in two weeks or the patient may call to inform of status. The patient was discharged in a stable condition

## 2017-08-22 ENCOUNTER — CLINICAL SUPPORT (OUTPATIENT)
Dept: REHABILITATION | Facility: HOSPITAL | Age: 57
End: 2017-08-22
Attending: ORTHOPAEDIC SURGERY
Payer: OTHER MISCELLANEOUS

## 2017-08-22 DIAGNOSIS — M54.41 CHRONIC BILATERAL LOW BACK PAIN WITH RIGHT-SIDED SCIATICA: Primary | ICD-10-CM

## 2017-08-22 DIAGNOSIS — R26.2 DIFFICULTY WALKING: ICD-10-CM

## 2017-08-22 DIAGNOSIS — M62.81 MUSCLE WEAKNESS: ICD-10-CM

## 2017-08-22 DIAGNOSIS — G89.29 CHRONIC PAIN OF LEFT KNEE: ICD-10-CM

## 2017-08-22 DIAGNOSIS — M25.562 CHRONIC PAIN OF LEFT KNEE: ICD-10-CM

## 2017-08-22 DIAGNOSIS — M25.662 DECREASED ROM OF LEFT KNEE: ICD-10-CM

## 2017-08-22 DIAGNOSIS — G89.29 CHRONIC BILATERAL LOW BACK PAIN WITH RIGHT-SIDED SCIATICA: Primary | ICD-10-CM

## 2017-08-22 PROCEDURE — 97140 MANUAL THERAPY 1/> REGIONS: CPT | Performed by: PHYSICAL THERAPIST

## 2017-08-22 PROCEDURE — 97110 THERAPEUTIC EXERCISES: CPT | Performed by: PHYSICAL THERAPIST

## 2017-08-22 NOTE — PLAN OF CARE
"Physical Therapy Progress Note     Name: John Macedo  Clinic Number: [id number]  Diagnosis:   Encounter Diagnoses   Name Primary?    Chronic bilateral low back pain with right-sided sciatica Yes    Chronic pain of left knee     Decreased ROM of left knee     Difficulty walking     Muscle weakness      Physician: Alvarez Garcia MD  Treatment Orders: PT Eval and Treat  Past Medical History:   Diagnosis Date    Arthritis     GERD (gastroesophageal reflux disease)        Precautions: as per diagnosis TKR L May 2016  Evaluation date: 6/27/2017  Visit # authorized: 9/12 on 8/22/2017  Authorization period: 6-15-18  Plan of care expiration: 10-2-17  MD Follow up appt:none scheduled    Subjective     Pt reports: shot helped a lot less back and knee is mainly stiff.  Pt states he is doing a lot more, walked about 1-1.5 miles in rough terrain.  Pt would do a little and then would rest.  Pt states that night cramping in quad region but happy able to do that much.  Next day a little sore, but next day back to normal though a little more knee pain    Pain Scale: before treatment: 4 in back and knee is 4 currently;  after treatment: knee feels better and more straight    Objective     In standing pt weight shift improved standing more even weight bearing as compared to IE    Functional assessment:   - walking: improved heel strike on L at IE lacks full knee extension at heel strike, antalgic and c/o pain as transition from foot flat to push off right before push off  - sit to stand: mod hand assist  - sit to supine: no difficulty       - supine to sit: no difficulty  - supine to prone: no difficulty     Pelvic positioning: level to start today at IE AR R    Lumbar active range of motion in standing is: 8-22-17  - flexion - mid calf                      - extension -  50%                         - left side bending -  To knee         - right side bending -  2" above knee             Lumbar active range of motion in " "standing is: initial eval  - flexion - knee  No reversal of lumbar noted                   - extension -  10%                         - right side bending -  4" above knee  Decreased C curve  - left side bending -  Mid thigh  Decreased C curve    Knee  ROM: (measured in degrees) 8-22-17 prior to ex  Knee (L)   Flexion 95   Extension -5      After exercise with emphasis on extension -3        Knee  ROM: (measured in degrees) initial eval  Knee (R) (L)   Flexion 130 80   Extension 5 -15            Flexibility testing:  - hamstrings:     90/90 test R 20 L 20 at IE R 30 L 30           - gastrocnemius:   DF 10B at IE DF ankle R 10 degrees L 5 degrees    Muscle Strength 8-22-17  MMT R L   Hip flexion 5/5 4+/5   Hip abduction 5/5 4/5   Hip extension 5/5 4+/5   Glut max 4/5 4-/5   Hip adduction 5/5 5-/5   Knee extension 5/5 4-/5   Knee flexion 5/5 4/5   Ankle dorsiflexion 5/5 5/5   Ankle plantar flexion 5/5 4/5   Ankle inversion 5/5 5/5   Ankle eversion 5/5 5/5            Muscle Strength initial eval  MMT R L   Hip flexion 5/5 4-/5   Hip abduction 5/5 3+/5   Hip extension 4+/5 4-/5   Glut max 3+/5 3/5   Hip adduction 5/5 4+/5   Knee extension 5/5 3+/5   Knee flexion 5/5 4-/5   Ankle dorsiflexion 5/5 5/5   Ankle plantar flexion 5/5 4-/5   Ankle inversion 5/5 5/5   Ankle eversion 5/5 5/5         Endurance is good at IE  fair    Palpation: mod-severe R lumbar paraspinals and QL     Joint mobility: normal spring lumbar     Sensation: Impaired: decreased lateral knee since first knee surgery had partial knee replacement  Reflexes: unable to elicit knee or ankle        Outcome measures:   FOTO lumbar disability index: 45 at IE 26  PT reviewed FOTO scores for John Macedo   FOTO scores were entered into EPIC - see media section.    PT reviewed FOTO scores for John Macedo on 7-20-17  FOTO score: 39 at IE 26    Knee  ROM: (measured in degrees) 8-8-17 before treatment  Knee (L)   Flexion 100   Extension -3         Knee  " ROM: (measured in degrees) 8-8-17 after treatment  Knee (L)   Flexion 105   Extension 0         Knee  ROM: (measured in degrees) initial eval  Knee (R) (L)   Flexion 130 80   Extension 5 -15     AR R pelvis to start         TREATMENT  Therapeutic exercise: John received therapeutic exercises to develop strength, endurance, ROM, flexibility and core stabilization for 15 minutes including:   Pt with pelvic dysfunction.    Pt performed push/pull exercise and able to note positive change in symptoms.  Instructed pt further in increased awareness of symptoms.  Instructed pt again in need to perform push/pull at onset of increased symptoms.    Single knee to chest  x10 opp knee straight  Push/pull x 3  Quad stretch with strap 5 min (NP)  Heel slides with strap x 5 min (NP)  Piriformis stretch x 10  Hamstring stretch supine x 10 opp knee straight   IT band stretch x 10 with knees bent   Hip flexor stretch off edge of mat with opp knee bent foot resting on mat  Quad stretch x 10  Prong hang x 5 min with 2#(NP)  Single knee to chest x 10 opp knee straight  Push/pull x 3      NP below today, to do at home Instructed pt to continue strengthening 3-5 times a week and to continue with work out at gym  SLR x 4  X 2/10 with 2#  Hip ext prone with bent knee 2/10 with pillow under stomach  Bridge 2/10  Single knee to chest x 10  Push/pull x 3      Manual therapy techniques were performed to decrease muscle tightness and pain to R LE  for 40 minutes. Pt received tool-assisted massage with manual therapy techniques to R IT band and knee to trigger an inflammatory healing response and stimulate the production of new collagen and proper, more functional, less painful healing.    Vacuum/cupping STM with manual therapy techniques was performed to knee IT band and quad to decrease muscle tightness, increase circulation and promote healing process.  The pt's skin was monitored for redness adjusting pressure as needed. The pt was  instructed in possible side effects of bruising and/or soreness.    Dry needling consent form was reviewed with the patient addressing all questions and concerns and signed by patient.  Patient also gave verbal consent to undergo dry needling.  Copy of the consent form was not provided to patient as requested by patient.  Dry needling with trigger point/manual therapy techniques was performed as per dry needle flow sheet to R LE and knee supine.  All needles were removed and changes in signs and symptoms were noted in dry needle flow sheet feeling less stiff same pain level.  Dry needling was performed to decrease inflammation, increase circulation, decrease pain and restore homeostasis.        Written Home Exercises Provided: none today  Pt demo good understanding of the education provided. John demonstrated good return demonstration of activities.     Pt. education:  · Posture reeducation, body mechanics, HEP,   · No spiritual or educational barriers to learning provided  · Pt has no cultural, educational or language barriers to learning provided.    Assessment   Patient demonstrates improvement in range of motion, strength, stabilization and function.    Pt has 3 more authorized visits for physical therapy and will benefit from continued treatment.  Pt had to miss some visits recently due to mother in law being gravely ill resulting in hospitalization    Pt will continue to benefit from skilled outpatient physical therapy to address the remaining functional deficits, provide pt/family education, and to maximize pt's level of independence in the home and community environment. .     GOALS:   Short Term Goals:  3 weeks  MET STG's  Increase range of motion 25%  Increase strength 1/2 muscle grade  Improve postural awareness of pelvis to independently identify dysfunction with min assist from PT  Be able to perform HEP with minimal cueing required  Improve functional impairment to 30     Long Term Goals: 6  weeks  Increase range of motion to 75% to 100% full 75% improved  Improve muscle strength 1 muscle grade 75% improved  Improve muscle strength with MMT to 4+/5 to 5/5 75% improved  Improve and stabilize proper pelvic positioning 75% improved  Restore ability to ambulate with near normal gait with pain at worst 3 or below  75% improved  Walking for ADL  will be restored with pain at worst 3 or below 75% improved  Restore ability to sit for ADL with pain at worst 3 or below 75% improved  Restore ability to perform yardwork with pain at worst 3 or below  50% IMPROVED  Restore ability to perform ADL's and household activities independently and with pain at worst 3 or below  75% improved  Improve functional impairment to 35 MET    Anticipated barriers to physical therapy: none  Pt's spiritual, cultural and educational needs considered and pt agreeable to plan of care and goals        Plan   If you concur, I recommend patient continue with physical therapy 1 time a week or every other week  for 3-6 weeks.  Please advise us of your  recommendations. Thank you for allowing us to assist in the care of your patient.      Argelia Wilson, MS, PT          I certify the need for these services furnished under this plan of treatment and while under my care.    ____________________________________ Physician/Referring Practitioner                                Date of Signature           --

## 2017-08-22 NOTE — PROGRESS NOTES
"Physical Therapy Progress Note     Name: John Macedo  Clinic Number: [id number]  Diagnosis:   Encounter Diagnoses   Name Primary?    Chronic bilateral low back pain with right-sided sciatica Yes    Chronic pain of left knee     Decreased ROM of left knee     Difficulty walking     Muscle weakness      Physician: Alvarez Garcia MD  Treatment Orders: PT Eval and Treat  Past Medical History:   Diagnosis Date    Arthritis     GERD (gastroesophageal reflux disease)        Precautions: as per diagnosis TKR L May 2016  Evaluation date: 6/27/2017  Visit # authorized: 9/12 on 8/22/2017  Authorization period: 6-15-18  Plan of care expiration: 10-2-17  MD Follow up appt:none scheduled    Subjective     Pt reports: shot helped a lot less back and knee is mainly stiff.  Pt states he is doing a lot more, walked about 1-1.5 miles in rough terrain.  Pt would do a little and then would rest.  Pt states that night cramping in quad region but happy able to do that much.  Next day a little sore, but next day back to normal though a little more knee pain    Pain Scale: before treatment: 4 in back and knee is 4 currently;  after treatment: knee feels better and more straight    Objective     In standing pt weight shift improved standing more even weight bearing as compared to IE    Functional assessment:   - walking: improved heel strike on L at IE lacks full knee extension at heel strike, antalgic and c/o pain as transition from foot flat to push off right before push off  - sit to stand: mod hand assist  - sit to supine: no difficulty       - supine to sit: no difficulty  - supine to prone: no difficulty     Pelvic positioning: level to start today at IE AR R    Lumbar active range of motion in standing is: 8-22-17  - flexion - mid calf                      - extension -  50%                         - left side bending -  To knee         - right side bending -  2" above knee             Lumbar active range of motion in " "standing is: initial eval  - flexion - knee  No reversal of lumbar noted                   - extension -  10%                         - right side bending -  4" above knee  Decreased C curve  - left side bending -  Mid thigh  Decreased C curve    Knee  ROM: (measured in degrees) 8-22-17 prior to ex  Knee (L)   Flexion 95   Extension -5      After exercise with emphasis on extension -3        Knee  ROM: (measured in degrees) initial eval  Knee (R) (L)   Flexion 130 80   Extension 5 -15            Flexibility testing:  - hamstrings:     90/90 test R 20 L 20 at IE R 30 L 30           - gastrocnemius:   DF 10B at IE DF ankle R 10 degrees L 5 degrees    Muscle Strength 8-22-17  MMT R L   Hip flexion 5/5 4+/5   Hip abduction 5/5 4/5   Hip extension 5/5 4+/5   Glut max 4/5 4-/5   Hip adduction 5/5 5-/5   Knee extension 5/5 4-/5   Knee flexion 5/5 4/5   Ankle dorsiflexion 5/5 5/5   Ankle plantar flexion 5/5 4/5   Ankle inversion 5/5 5/5   Ankle eversion 5/5 5/5            Muscle Strength initial eval  MMT R L   Hip flexion 5/5 4-/5   Hip abduction 5/5 3+/5   Hip extension 4+/5 4-/5   Glut max 3+/5 3/5   Hip adduction 5/5 4+/5   Knee extension 5/5 3+/5   Knee flexion 5/5 4-/5   Ankle dorsiflexion 5/5 5/5   Ankle plantar flexion 5/5 4-/5   Ankle inversion 5/5 5/5   Ankle eversion 5/5 5/5         Endurance is good at IE  fair    Palpation: mod-severe R lumbar paraspinals and QL     Joint mobility: normal spring lumbar     Sensation: Impaired: decreased lateral knee since first knee surgery had partial knee replacement  Reflexes: unable to elicit knee or ankle        Outcome measures:   FOTO lumbar disability index: 45 at IE 26  PT reviewed FOTO scores for John Macedo   FOTO scores were entered into EPIC - see media section.    PT reviewed FOTO scores for John Macedo on 7-20-17  FOTO score: 39 at IE 26    Knee  ROM: (measured in degrees) 8-8-17 before treatment  Knee (L)   Flexion 100   Extension -3         Knee  " ROM: (measured in degrees) 8-8-17 after treatment  Knee (L)   Flexion 105   Extension 0         Knee  ROM: (measured in degrees) initial eval  Knee (R) (L)   Flexion 130 80   Extension 5 -15     AR R pelvis to start         TREATMENT  Therapeutic exercise: John received therapeutic exercises to develop strength, endurance, ROM, flexibility and core stabilization for 15 minutes including:   Pt with pelvic dysfunction.    Pt performed push/pull exercise and able to note positive change in symptoms.  Instructed pt further in increased awareness of symptoms.  Instructed pt again in need to perform push/pull at onset of increased symptoms.    Single knee to chest  x10 opp knee straight  Push/pull x 3  Quad stretch with strap 5 min (NP)  Heel slides with strap x 5 min (NP)  Piriformis stretch x 10  Hamstring stretch supine x 10 opp knee straight   IT band stretch x 10 with knees bent   Hip flexor stretch off edge of mat with opp knee bent foot resting on mat  Quad stretch x 10  Prong hang x 5 min with 2#(NP)  Single knee to chest x 10 opp knee straight  Push/pull x 3      NP below today, to do at home Instructed pt to continue strengthening 3-5 times a week and to continue with work out at gym  SLR x 4  X 2/10 with 2#  Hip ext prone with bent knee 2/10 with pillow under stomach  Bridge 2/10  Single knee to chest x 10  Push/pull x 3      Manual therapy techniques were performed to decrease muscle tightness and pain to R LE  for 40 minutes. Pt received tool-assisted massage with manual therapy techniques to R IT band and knee to trigger an inflammatory healing response and stimulate the production of new collagen and proper, more functional, less painful healing.    Vacuum/cupping STM with manual therapy techniques was performed to knee IT band and quad to decrease muscle tightness, increase circulation and promote healing process.  The pt's skin was monitored for redness adjusting pressure as needed. The pt was  instructed in possible side effects of bruising and/or soreness.    Dry needling consent form was reviewed with the patient addressing all questions and concerns and signed by patient.  Patient also gave verbal consent to undergo dry needling.  Copy of the consent form was not provided to patient as requested by patient.  Dry needling with trigger point/manual therapy techniques was performed as per dry needle flow sheet to R LE and knee supine.  All needles were removed and changes in signs and symptoms were noted in dry needle flow sheet feeling less stiff same pain level.  Dry needling was performed to decrease inflammation, increase circulation, decrease pain and restore homeostasis.        Written Home Exercises Provided: none today  Pt demo good understanding of the education provided. John demonstrated good return demonstration of activities.     Pt. education:  · Posture reeducation, body mechanics, HEP,   · No spiritual or educational barriers to learning provided  · Pt has no cultural, educational or language barriers to learning provided.    Assessment   Patient demonstrates improvement in range of motion, strength, stabilization and function.    Pt has 3 more authorized visits for physical therapy and will benefit from continued treatment.  Pt had to miss some visits recently due to mother in law being gravely ill resulting in hospitalization    Pt will continue to benefit from skilled outpatient physical therapy to address the remaining functional deficits, provide pt/family education, and to maximize pt's level of independence in the home and community environment. .     GOALS:   Short Term Goals:  3 weeks  MET STG's  Increase range of motion 25%  Increase strength 1/2 muscle grade  Improve postural awareness of pelvis to independently identify dysfunction with min assist from PT  Be able to perform HEP with minimal cueing required  Improve functional impairment to 30     Long Term Goals: 6  weeks  Increase range of motion to 75% to 100% full 75% improved  Improve muscle strength 1 muscle grade 75% improved  Improve muscle strength with MMT to 4+/5 to 5/5 75% improved  Improve and stabilize proper pelvic positioning 75% improved  Restore ability to ambulate with near normal gait with pain at worst 3 or below  75% improved  Walking for ADL  will be restored with pain at worst 3 or below 75% improved  Restore ability to sit for ADL with pain at worst 3 or below 75% improved  Restore ability to perform yardwork with pain at worst 3 or below  50% IMPROVED  Restore ability to perform ADL's and household activities independently and with pain at worst 3 or below  75% improved  Improve functional impairment to 35 MET    Anticipated barriers to physical therapy: none  Pt's spiritual, cultural and educational needs considered and pt agreeable to plan of care and goals        Plan   If you concur, I recommend patient continue with physical therapy 1 time a week or every other week  for 3-6 weeks.  Please advise us of your  recommendations. Thank you for allowing us to assist in the care of your patient.      Argelia Wilson, MS, PT          I certify the need for these services furnished under this plan of treatment and while under my care.    ____________________________________ Physician/Referring Practitioner                                Date of Signature           --

## 2017-09-13 ENCOUNTER — TELEPHONE (OUTPATIENT)
Dept: REHABILITATION | Facility: HOSPITAL | Age: 57
End: 2017-09-13

## 2017-09-20 ENCOUNTER — CLINICAL SUPPORT (OUTPATIENT)
Dept: REHABILITATION | Facility: HOSPITAL | Age: 57
End: 2017-09-20
Attending: ORTHOPAEDIC SURGERY
Payer: OTHER MISCELLANEOUS

## 2017-09-20 DIAGNOSIS — R26.2 DIFFICULTY WALKING: ICD-10-CM

## 2017-09-20 DIAGNOSIS — G89.29 CHRONIC PAIN OF LEFT KNEE: ICD-10-CM

## 2017-09-20 DIAGNOSIS — M54.41 CHRONIC BILATERAL LOW BACK PAIN WITH RIGHT-SIDED SCIATICA: Primary | ICD-10-CM

## 2017-09-20 DIAGNOSIS — G89.29 CHRONIC BILATERAL LOW BACK PAIN WITH RIGHT-SIDED SCIATICA: Primary | ICD-10-CM

## 2017-09-20 DIAGNOSIS — M25.662 DECREASED ROM OF LEFT KNEE: ICD-10-CM

## 2017-09-20 DIAGNOSIS — M62.81 MUSCLE WEAKNESS: ICD-10-CM

## 2017-09-20 DIAGNOSIS — M25.562 CHRONIC PAIN OF LEFT KNEE: ICD-10-CM

## 2017-09-20 PROCEDURE — 97140 MANUAL THERAPY 1/> REGIONS: CPT | Performed by: PHYSICAL THERAPIST

## 2017-09-20 PROCEDURE — 97112 NEUROMUSCULAR REEDUCATION: CPT | Performed by: PHYSICAL THERAPIST

## 2017-09-20 PROCEDURE — 97110 THERAPEUTIC EXERCISES: CPT | Performed by: PHYSICAL THERAPIST

## 2017-09-20 NOTE — PROGRESS NOTES
Physical Therapy Daily Note     Name: John Macedo  Clinic Number: [id number]  Diagnosis:   Encounter Diagnoses   Name Primary?    Chronic bilateral low back pain with right-sided sciatica Yes    Chronic pain of left knee     Decreased ROM of left knee     Difficulty walking     Muscle weakness      Physician: Alvarez Garcia MD  Treatment Orders: PT Eval and Treat  Past Medical History:   Diagnosis Date    Arthritis     GERD (gastroesophageal reflux disease)        Precautions: as per diagnosis TKR L May 2016  Evaluation date: 6/27/2017  Visit # authorized: 10/12 on 9/20/2017  Authorization period: 6-15-18  Plan of care expiration: 10-2-17  MD Follow up appt:none scheduled    Subjective     Pt reports: Knee is coming along, feeling better, still weak, but not constant ache.  CC is LBP which over past 3 weeks is starting to go further down R leg to back of knee, previously would only be in hip.  Pain in LB is across LB and up with burning sensation and ache and feels like muscles are trying to contract in back up to bottom of scapula  Aggravation is sitting, twisting, walking and FB    Pain Scale: before treatment: 7 in back to bottom of hip and knee is 2-3 currently; after treatment: knee 2 6 in back with pain to buttock and burning up    Objective     Slight AR R pelvis to start     Pt reports sleeping on stomach  Knees to chest don't change symptoms  Extension decreased pain to 6/10 still buttock pain and burning up back        TREATMENT  Therapeutic exercise: John received therapeutic exercises to develop strength, endurance, ROM, flexibility and core stabilization for 15 minutes including:     Flexion testing and extension testing  Instructed pt in flexion/extension principles   Pt responded well to extension principles.      Push/pull x 3  Press up x 10    Instructed pt further in proper standing posture    Neuromuscular re-education was performed to improve postural awareness and increase  scapular stabilization to restore upper body function for 10 minutes.      Eval of standing posture reveals slight FB of trunk  Instructed pt in more erect posture José Luis taping for back awareness to maintain extension.  Instructed pt in use, care and precautions with tape.    Not performed  Quad stretch with strap 5 min (NP)  Heel slides with strap x 5 min (NP)  Piriformis stretch x 10  Hamstring stretch supine x 10 opp knee straight   IT band stretch x 10 with knees bent   Hip flexor stretch off edge of mat with opp knee bent foot resting on mat  Quad stretch x 10  Prong hang x 5 min with 2#(NP)  Single knee to chest x 10 opp knee straight  Push/pull x 3      NP below today, to do at home Instructed pt to continue strengthening 3-5 times a week and to continue with work out at gym  SLR x 4  X 2/10 with 2#  Hip ext prone with bent knee 2/10 with pillow under stomach  Bridge 2/10  Single knee to chest x 10  Push/pull x 3      Manual therapy techniques were performed to decrease muscle tightness B iliopsoas and abdominal reasons for 30 minutes. Pt received tool-assisted massage with manual therapy techniques to iliopsoas B and abdominal region to trigger an inflammatory healing response and stimulate the production of new collagen and proper, more functional, less painful healing.    Vacuum/cupping STM with manual therapy techniques was performed to iliopsoas B and abdominal region  to decrease muscle tightness, increase circulation and promote healing process.  The pt's skin was monitored for redness adjusting pressure as needed. The pt was instructed in possible side effects of bruising and/or soreness.         Written Home Exercises Provided: none today  Pt demo good understanding of the education provided. John demonstrated good return demonstration of activities.     Pt. education:  · Posture reeducation, body mechanics, HEP,   · No spiritual or educational barriers to learning provided  · Pt has no  cultural, educational or language barriers to learning provided.    Assessment   Patient with improved posture after standing and slight decrease in pain  Pt has 3 more authorized visits for physical therapy and will benefit from continued treatment.  Pt had to miss some visits recently due to mother in law being gravely ill resulting in hospitalization    Pt will continue to benefit from skilled outpatient physical therapy to address the remaining functional deficits, provide pt/family education, and to maximize pt's level of independence in the home and community environment. .     GOALS:   Short Term Goals:  3 weeks  MET STG's  Increase range of motion 25%  Increase strength 1/2 muscle grade  Improve postural awareness of pelvis to independently identify dysfunction with min assist from PT  Be able to perform HEP with minimal cueing required  Improve functional impairment to 30     Long Term Goals: 6 weeks  Increase range of motion to 75% to 100% full 75% improved  Improve muscle strength 1 muscle grade 75% improved  Improve muscle strength with MMT to 4+/5 to 5/5 75% improved  Improve and stabilize proper pelvic positioning 75% improved  Restore ability to ambulate with near normal gait with pain at worst 3 or below  75% improved  Walking for ADL  will be restored with pain at worst 3 or below 75% improved  Restore ability to sit for ADL with pain at worst 3 or below 75% improved  Restore ability to perform yardwork with pain at worst 3 or below  50% IMPROVED  Restore ability to perform ADL's and household activities independently and with pain at worst 3 or below  75% improved  Improve functional impairment to 35 MET    Anticipated barriers to physical therapy: none  Pt's spiritual, cultural and educational needs considered and pt agreeable to plan of care and goals        Plan   If you concur, I recommend patient continue with physical therapy 1 time a week or every other week  for 3-6 weeks.  Please advise us  of your  recommendations. Thank you for allowing us to assist in the care of your patient.      Argelia Wilson, MS, PT          I certify the need for these services furnished under this plan of treatment and while under my care.    ____________________________________ Physician/Referring Practitioner                                Date of Signature           --

## 2017-09-27 ENCOUNTER — CLINICAL SUPPORT (OUTPATIENT)
Dept: REHABILITATION | Facility: HOSPITAL | Age: 57
End: 2017-09-27
Attending: ORTHOPAEDIC SURGERY
Payer: OTHER MISCELLANEOUS

## 2017-09-27 DIAGNOSIS — M62.81 MUSCLE WEAKNESS: ICD-10-CM

## 2017-09-27 DIAGNOSIS — M54.41 CHRONIC BILATERAL LOW BACK PAIN WITH RIGHT-SIDED SCIATICA: Primary | ICD-10-CM

## 2017-09-27 DIAGNOSIS — G89.29 CHRONIC PAIN OF LEFT KNEE: ICD-10-CM

## 2017-09-27 DIAGNOSIS — R26.2 DIFFICULTY WALKING: ICD-10-CM

## 2017-09-27 DIAGNOSIS — G89.29 CHRONIC BILATERAL LOW BACK PAIN WITH RIGHT-SIDED SCIATICA: Primary | ICD-10-CM

## 2017-09-27 DIAGNOSIS — M25.562 CHRONIC PAIN OF LEFT KNEE: ICD-10-CM

## 2017-09-27 DIAGNOSIS — M25.662 DECREASED ROM OF LEFT KNEE: ICD-10-CM

## 2017-09-27 PROCEDURE — 97110 THERAPEUTIC EXERCISES: CPT | Performed by: PHYSICAL THERAPIST

## 2017-09-27 PROCEDURE — 97140 MANUAL THERAPY 1/> REGIONS: CPT | Performed by: PHYSICAL THERAPIST

## 2017-09-27 NOTE — PROGRESS NOTES
Physical Therapy Daily Note     Name: John aMcedo  Clinic Number: [id number]  Diagnosis:   Encounter Diagnoses   Name Primary?    Chronic bilateral low back pain with right-sided sciatica Yes    Chronic pain of left knee     Decreased ROM of left knee     Difficulty walking     Muscle weakness      Physician: Alvarez Garcia MD  Treatment Orders: PT Eval and Treat  Past Medical History:   Diagnosis Date    Arthritis     GERD (gastroesophageal reflux disease)        Precautions: as per diagnosis TKR L May 2016  Evaluation date: 6/27/2017  Visit # authorized: 11/12 on 9/27/2017  Authorization period: 6-15-18  Plan of care expiration: 10-2-17  MD Follow up appt:none scheduled    Subjective     Pt reports: aching in R SI joint and burning low back  Tape did not hold     Pain Scale: before treatment: 5-6 in R SI joint back and burningcurrently; after treatment: 5 in back with less burning and feel more erect    Objective     Slight AR R pelvis to start     TREATMENT  Therapeutic exercise: John received therapeutic exercises to develop strength, endurance, ROM, flexibility and core stabilization for 15 minutes including:          Push/pull x 3  Press up x 10  Hip flexor stretch x 30 sec x 2  Arm and leg lift prone x 10  Push/pull x 3  Press up x 10      Instructed pt further in proper standing posture      Not performed  Quad stretch with strap 5 min (NP)  Heel slides with strap x 5 min (NP)  Piriformis stretch x 10  Hamstring stretch supine x 10 opp knee straight   IT band stretch x 10 with knees bent   Hip flexor stretch off edge of mat with opp knee bent foot resting on mat  Quad stretch x 10  Prong hang x 5 min with 2#(NP)  Single knee to chest x 10 opp knee straight  Push/pull x 3      NP below today, to do at home Instructed pt to continue strengthening 3-5 times a week and to continue with work out at gym  SLR x 4  X 2/10 with 2#  Hip ext prone with bent knee 2/10 with pillow under stomach  Bridge  2/10  Single knee to chest x 10  Push/pull x 3      Manual therapy techniques were performed to decrease muscle tightness B iliopsoas and abdominal reasons for 30 minutes. Pt received tool-assisted massage with manual therapy techniques to iliopsoas B and abdominal region to trigger an inflammatory healing response and stimulate the production of new collagen and proper, more functional, less painful healing.    Vacuum/cupping STM with manual therapy techniques was performed to iliopsoas B and abdominal region  to decrease muscle tightness, increase circulation and promote healing process.  The pt's skin was monitored for redness adjusting pressure as needed. The pt was instructed in possible side effects of bruising and/or soreness.         Written Home Exercises Provided: none today  Pt demo good understanding of the education provided. John demonstrated good return demonstration of activities.     Pt. education:  · Posture reeducation, body mechanics, HEP,   · No spiritual or educational barriers to learning provided  · Pt has no cultural, educational or language barriers to learning provided.    Assessment   Patient with improved posture after standing rob after manual therapy and slight decrease in pain Pt to get wife to help STM abdominal area prior to next visit and see if can maintain proper posture better  Pt has 1 more authorized visits for physical therapy and will benefit from continued treatment.    Pt will continue to benefit from skilled outpatient physical therapy to address the remaining functional deficits, provide pt/family education, and to maximize pt's level of independence in the home and community environment. .     GOALS:   Short Term Goals:  3 weeks  MET STG's  Increase range of motion 25%  Increase strength 1/2 muscle grade  Improve postural awareness of pelvis to independently identify dysfunction with min assist from PT  Be able to perform HEP with minimal cueing required  Improve  functional impairment to 30     Long Term Goals: 6 weeks  Increase range of motion to 75% to 100% full 75% improved  Improve muscle strength 1 muscle grade 75% improved  Improve muscle strength with MMT to 4+/5 to 5/5 75% improved  Improve and stabilize proper pelvic positioning 75% improved  Restore ability to ambulate with near normal gait with pain at worst 3 or below  75% improved  Walking for ADL  will be restored with pain at worst 3 or below 75% improved  Restore ability to sit for ADL with pain at worst 3 or below 75% improved  Restore ability to perform yardwork with pain at worst 3 or below  50% IMPROVED  Restore ability to perform ADL's and household activities independently and with pain at worst 3 or below  75% improved  Improve functional impairment to 35 MET    Anticipated barriers to physical therapy: none  Pt's spiritual, cultural and educational needs considered and pt agreeable to plan of care and goals        Plan   Continue with established plan of care towards PT goals.               --

## 2018-01-05 ENCOUNTER — DOCUMENTATION ONLY (OUTPATIENT)
Dept: REHABILITATION | Facility: HOSPITAL | Age: 58
End: 2018-01-05

## 2018-01-05 DIAGNOSIS — M54.41 CHRONIC BILATERAL LOW BACK PAIN WITH RIGHT-SIDED SCIATICA: Primary | ICD-10-CM

## 2018-01-05 DIAGNOSIS — G89.29 CHRONIC PAIN OF LEFT KNEE: ICD-10-CM

## 2018-01-05 DIAGNOSIS — M25.662 DECREASED ROM OF LEFT KNEE: ICD-10-CM

## 2018-01-05 DIAGNOSIS — M25.562 CHRONIC PAIN OF LEFT KNEE: ICD-10-CM

## 2018-01-05 DIAGNOSIS — G89.29 CHRONIC BILATERAL LOW BACK PAIN WITH RIGHT-SIDED SCIATICA: Primary | ICD-10-CM

## 2018-01-05 DIAGNOSIS — R26.2 DIFFICULTY WALKING: ICD-10-CM

## 2018-01-05 DIAGNOSIS — M62.81 MUSCLE WEAKNESS: ICD-10-CM

## 2018-01-05 NOTE — PROGRESS NOTES
PHYSICAL THERAPY DISCHARGE SUMMARY    Name: John Macedo  Referring Provider: Alvarez Garcia MD  PT Order: PT evaluate and treat   Clinical #: [id number]  Discharge Summary Date: 1/5/2018  Diagnosis:   1. Chronic bilateral low back pain with right-sided sciatica     2. Chronic pain of left knee     3. Decreased ROM of left knee     4. Difficulty walking     5. Muscle weakness         Patient was seen for 11 OP PT visits from 6-27-17 to 9-27-17.  Treatment included: evaluation, HEP, pt education,  joint and soft tissue mobilizations, ther ex, and mechanical lumbar traction. Pt contacted me via e-mail on 11-21-17.  He reported that he was slowly gaining strength in leg I and recently underwent rhizotomy.  PT unable to fully assess goal achievement as pt did not return for follow up sessions/did not reschedule follow up visits.  This patient is discharged from OP PT Services.

## 2021-04-30 NOTE — TELEPHONE ENCOUNTER
I forward the message to Katherin and I left a message for her to get the correct information back to us asap.    OUTPATIENT OFFICE VISIT EXAM NOTE        HISTORY    Graham Foley is a 12 year old female who presents for the following issues:    1. Eczema, Allergic rhinitis, chronic, not improving as anticipated: Patient was seen by dermatologist on [address] - Dr Moore [facility]. Was given a cream for her face. Mother is requesting a refill of the cream because it was sent to the wrong place.  At times she has to use her albuterol at night time when allergies are worse and having chest tightness before bed; less than 4 times a week. Patient has quite severe allergy symptoms. Patient is able to exercise at school without symptoms. Patient denies shortness of breath, chest pain, heart palpitations at rest or with exertion.     MEDICATIONS    Current Outpatient Medications   Medication Sig   • cetirizine (ZyrTEC) 5 MG/5ML solution Take 5 mLs by mouth daily. Indications: allergies   • hydroCORTisone (CORTIZONE) 2.5 % ointment Apply topically 2 times daily. Until rash is gone   • albuterol (PROAIR HFA) 108 (90 Base) MCG/ACT inhaler Inhale 2 puffs into the lungs every 4 hours as needed for Shortness of Breath or Wheezing (30 min prior to exercise).   • fluticasone (Flonase) 50 MCG/ACT nasal spray Spray 1 spray in each nostril daily.   • albuterol 108 (90 Base) MCG/ACT inhaler Inhale 1 puff into the lungs every 4 hours as needed for Shortness of Breath or Wheezing.     No current facility-administered medications for this visit.       Pertinent past medical, surgical, social and family history reviewed and updated in [facility].      REVIEW OF SYSTEMS    GENERAL:  Denies fever, chills  HEENT: + rhinorrhea, congestion Denies ear pain, or sore throat.  SKIN:  + eczema     PHYSICAL EXAM    Visit Vitals  /62   Pulse 86   Temp 97.3 °F (36.3 °C) (Tympanic)   Resp 18   Ht 4' 8.5\" (1.435 m)   Wt (!) 77.7 kg   SpO2 98%   BMI 37.73 kg/m²     GENERAL:  Alert, no acute distress, appropriately dressed.  HEENT:  EOMI, no  scleral icterus, buccal mucosa moist, no oropharyngeal exudate, bilateral tympanic membrane and ear canals are normal appearing, congestion appreciated, no abnormalities of nasal canal   NECK:  Supple, no cervical lymphadenopathy.   CHEST/LUNG:  No respiratory distress, good air flow to bases, clear to auscultation bilaterally.  CARDIOVASCULAR:  Regular rate and rhythm, S1, S2 normal; no murmurs.  ABDOMEN:  Soft, + bowel sounds, nondistended  MUSCULOSKELETAL:  Strength grossly normal in bilateral upper and lower extremities.  EXTREMITIES:  No edema or cyanosis.  SKIN:  Warm, dry, lesion on scalp - fluid filled, small bumps on face -  Cheek and forehead  NEURO:  Gait normal, coordination intact, CN II-XII grossly intact.  PSYCH:  Mood and affect appropriate.      ASSESSMENT & PLAN    Graham Fleming was seen today for follow-up.    Diagnoses and all orders for this visit:    Seasonal allergic rhinitis, unspecified trigger  Chronic, not improving as anticipated. Start cetirizine 5mg daily, flonase daily as needed for symptoms. Recommend consistency with medication use during seasons when Patient has worse symptoms. Alert provider if no improvement.     Exercise-induced asthma  Chronic, not improving as anticipated. Likely uncontrolled allergy symptoms are contributing. Refill albuterol. If using albuterol > 4 times per week, alert provider.   -     albuterol (ProAir HFA) 108 (90 Base) MCG/ACT inhaler; Inhale 2 puffs into the lungs every 4 hours as needed for Shortness of Breath or Wheezing (30 min prior to exercise).    Skin rash  Will request records from dermatologist. Based on today's exam, area of concern is consisted with milia or small pustular acne. Recommended keeping the area clean and dry with mild cleanser.     Hasn't made appointment with Nutritionist yet; given information to assist in making this appointment.     Other orders  -     fluticasone (Flonase) 50 MCG/ACT nasal spray; Spray 1 spray in each nostril  daily.  -     cetirizine (ZyrTEC) 5 MG/5ML solution; Take 5 mLs by mouth daily. Indications: allergies    Return in about 2 months (around 6/30/2021) for weight/follow up .    Delaney Liang MD

## 2021-05-10 NOTE — DISCHARGE INSTRUCTIONS
Knee Pain  Knee pain is very common. Its especially common in active people who put a lot of pressure on their knees, like runners. It affects women more often than men.  Your kneecap (patella) is a thick, round bone. It covers and protects the front portion of your knee joint. It moves along a groove in your thighbone (femur) as part of the patellofemoral joint. A layer of cartilage surrounds the underside of your kneecap. This layer protects it from grinding against your femur.  When this cartilage softens and breaks down, it can cause knee pain. This is partly because of repetitive stress. The stress irritates the lining of the joint. This causes pain in the underlying bone.  What causes knee pain?  Many things can cause knee pain. You may have more than one cause. Some of these include:  · Overuse of the knee joint  · The kneecap doesnt line up with the tissue around it  · Damage to small nerves in the area  · Damage to the ligament-like structure that holds the kneecap in place (retinaculum)  · Breakdown of the bone under the cartilage  · Swelling in the soft tissues around the kneecap  · Injury  You might be more likely to have knee pain if you:  · Exercise a lot  · Recently increased the intensity of your workouts  · Have a body mass index (BMI) greater than 25  · Have poor alignment of your kneecap  · Walk with your feet turned overly outward or inward  · Have weakness in surrounding muscle groups (inner quad or hip adductor muscles)  · Have too much tightness in surrounding muscle groups (hamstrings or iliotibial band)  · Have a recent history of injury to the area  · Are female  Symptoms of knee pain  This type of knee pain is a dull, aching pain in the front of the knee in the area under and around the kneecap. This pain may start quickly or slowly. Your pain might be worse when you squat, run, or sit for a long time. You might also sometimes feel like your knee is giving out. You may have symptoms in  one or both of your knees.  Diagnosing knee pain  Your health care provider will ask about your medical history and your symptoms. Be sure to describe any activities that make your knee pain worse. He or she will look at your knee. This will include tests of your range of motion, strength, and areas of pain of your knee. Your knee alignment will be checked.  Your health care provider will need to rule out other causes of your knee pain, such as arthritis. You may need an imaging test, such as an X-ray or MRI.  Treatment for knee pain  Treatments that can help ease your symptoms may include:  · Avoiding activities for a while that make your pain worse, returning to activity over time  · Icing the outside of your knee when it causes you pain  · Taking over-the-counter pain medicine  · Wearing a knee brace or taping your knee to support it  · Wearing special shoe inserts to help keep your feet in the proper alignment  · Doing special exercises to stretch and strengthen the muscles around your hip and your knee  These steps help most people manage knee pain. But some cases of knee pain need to be treated with surgery. You may need surgery right away. Or you may need it later if other treatments dont work. Your health care provider may refer you to an orthopedic surgeon. He or she will talk with you about your choices.  Preventing knee pain  Losing weight and correcting excess muscle tightness or muscle weakness may help lower your risk.  In some cases, you can prevent knee pain. To help prevent a flare-up of knee pain, you do these things:  · Regularly do all the exercises your doctor or physical therapist advises  · Support your knee as advised by your doctor or physical therapist  · Increase training gradually, and ease up on training when needed  · Have an expert check your gait for running or other sporting activities  · Stretch properly before and after exercise  · Replace your running shoes regularly  · Lose  excess weight     When to call your health care provider  Call your health care provider right away if:  · Your symptoms dont get better after a few weeks of treatment  · You have any new symptoms   Date Last Reviewed: 3/19/2015  © 9919-9795 [employer]. [address] [place] [address], [place] [address]. All rights reserved. This information is not intended as a substitute for professional medical care. Always follow your healthcare professional's instructions.         [Negative] : Heme/Lymph

## 2021-10-15 ENCOUNTER — HOSPITAL ENCOUNTER (OUTPATIENT)
Facility: HOSPITAL | Age: 61
Discharge: HOME | End: 2021-10-16
Attending: EMERGENCY MEDICINE | Admitting: INTERNAL MEDICINE
Payer: COMMERCIAL

## 2021-10-15 DIAGNOSIS — R07.9 CHEST PAIN: Primary | ICD-10-CM

## 2021-10-15 DIAGNOSIS — I24.9 ACS (ACUTE CORONARY SYNDROME): ICD-10-CM

## 2021-10-15 LAB
ALBUMIN SERPL BCP-MCNC: 4 G/DL (ref 3.5–5.2)
ALP SERPL-CCNC: 42 U/L (ref 55–135)
ALT SERPL W/O P-5'-P-CCNC: 16 U/L (ref 10–44)
ANION GAP SERPL CALC-SCNC: 9 MMOL/L (ref 8–16)
AST SERPL-CCNC: 15 U/L (ref 10–40)
BASOPHILS # BLD AUTO: 0.03 K/UL (ref 0–0.2)
BASOPHILS NFR BLD: 0.2 % (ref 0–1.9)
BILIRUB SERPL-MCNC: 0.8 MG/DL (ref 0.1–1)
BNP SERPL-MCNC: 63 PG/ML (ref 0–99)
BUN SERPL-MCNC: 19 MG/DL (ref 6–20)
CALCIUM SERPL-MCNC: 8.9 MG/DL (ref 8.7–10.5)
CHLORIDE SERPL-SCNC: 102 MMOL/L (ref 95–110)
CO2 SERPL-SCNC: 25 MMOL/L (ref 23–29)
CREAT SERPL-MCNC: 1.2 MG/DL (ref 0.5–1.4)
DIFFERENTIAL METHOD: ABNORMAL
EOSINOPHIL # BLD AUTO: 0 K/UL (ref 0–0.5)
EOSINOPHIL NFR BLD: 0.1 % (ref 0–8)
ERYTHROCYTE [DISTWIDTH] IN BLOOD BY AUTOMATED COUNT: 13.2 % (ref 11.5–14.5)
EST. GFR  (AFRICAN AMERICAN): >60 ML/MIN/1.73 M^2
EST. GFR  (NON AFRICAN AMERICAN): >60 ML/MIN/1.73 M^2
GLUCOSE SERPL-MCNC: 121 MG/DL (ref 70–110)
HCT VFR BLD AUTO: 41.1 % (ref 40–54)
HGB BLD-MCNC: 13.6 G/DL (ref 14–18)
IMM GRANULOCYTES # BLD AUTO: 0.04 K/UL (ref 0–0.04)
IMM GRANULOCYTES NFR BLD AUTO: 0.3 % (ref 0–0.5)
LYMPHOCYTES # BLD AUTO: 2 K/UL (ref 1–4.8)
LYMPHOCYTES NFR BLD: 15.5 % (ref 18–48)
MCH RBC QN AUTO: 30.2 PG (ref 27–31)
MCHC RBC AUTO-ENTMCNC: 33.1 G/DL (ref 32–36)
MCV RBC AUTO: 91 FL (ref 82–98)
MONOCYTES # BLD AUTO: 1 K/UL (ref 0.3–1)
MONOCYTES NFR BLD: 7.9 % (ref 4–15)
NEUTROPHILS # BLD AUTO: 9.8 K/UL (ref 1.8–7.7)
NEUTROPHILS NFR BLD: 76 % (ref 38–73)
NRBC BLD-RTO: 0 /100 WBC
PLATELET # BLD AUTO: 248 K/UL (ref 150–450)
PMV BLD AUTO: 9.3 FL (ref 9.2–12.9)
POTASSIUM SERPL-SCNC: 3.7 MMOL/L (ref 3.5–5.1)
PROT SERPL-MCNC: 7.7 G/DL (ref 6–8.4)
RBC # BLD AUTO: 4.51 M/UL (ref 4.6–6.2)
SODIUM SERPL-SCNC: 136 MMOL/L (ref 136–145)
TROPONIN I SERPL DL<=0.01 NG/ML-MCNC: <0.03 NG/ML
WBC # BLD AUTO: 12.85 K/UL (ref 3.9–12.7)

## 2021-10-15 PROCEDURE — 63600175 PHARM REV CODE 636 W HCPCS: Performed by: EMERGENCY MEDICINE

## 2021-10-15 PROCEDURE — 93010 EKG 12-LEAD: ICD-10-PCS | Mod: ,,, | Performed by: INTERNAL MEDICINE

## 2021-10-15 PROCEDURE — 96374 THER/PROPH/DIAG INJ IV PUSH: CPT

## 2021-10-15 PROCEDURE — 85025 COMPLETE CBC W/AUTO DIFF WBC: CPT | Performed by: EMERGENCY MEDICINE

## 2021-10-15 PROCEDURE — 80053 COMPREHEN METABOLIC PANEL: CPT | Performed by: EMERGENCY MEDICINE

## 2021-10-15 PROCEDURE — U0002 COVID-19 LAB TEST NON-CDC: HCPCS | Performed by: EMERGENCY MEDICINE

## 2021-10-15 PROCEDURE — 96361 HYDRATE IV INFUSION ADD-ON: CPT

## 2021-10-15 PROCEDURE — 93005 ELECTROCARDIOGRAM TRACING: CPT | Performed by: INTERNAL MEDICINE

## 2021-10-15 PROCEDURE — 99284 EMERGENCY DEPT VISIT MOD MDM: CPT | Mod: 25

## 2021-10-15 PROCEDURE — 83880 ASSAY OF NATRIURETIC PEPTIDE: CPT | Performed by: EMERGENCY MEDICINE

## 2021-10-15 PROCEDURE — 25000003 PHARM REV CODE 250: Performed by: EMERGENCY MEDICINE

## 2021-10-15 PROCEDURE — 84484 ASSAY OF TROPONIN QUANT: CPT | Performed by: EMERGENCY MEDICINE

## 2021-10-15 PROCEDURE — 93010 ELECTROCARDIOGRAM REPORT: CPT | Mod: ,,, | Performed by: INTERNAL MEDICINE

## 2021-10-15 RX ORDER — AMLODIPINE BESYLATE 5 MG/1
5 TABLET ORAL DAILY
COMMUNITY
Start: 2021-10-12

## 2021-10-15 RX ORDER — ONDANSETRON 2 MG/ML
4 INJECTION INTRAMUSCULAR; INTRAVENOUS
Status: DISPENSED | OUTPATIENT
Start: 2021-10-15 | End: 2021-10-16

## 2021-10-15 RX ORDER — IRBESARTAN 300 MG/1
300 TABLET ORAL DAILY
COMMUNITY
Start: 2021-10-12

## 2021-10-15 RX ORDER — OMEPRAZOLE 20 MG/1
20 CAPSULE, DELAYED RELEASE ORAL
Status: ON HOLD | COMMUNITY
Start: 2021-10-12 | End: 2021-10-16

## 2021-10-15 RX ORDER — MORPHINE SULFATE 2 MG/ML
2 INJECTION, SOLUTION INTRAMUSCULAR; INTRAVENOUS
Status: COMPLETED | OUTPATIENT
Start: 2021-10-15 | End: 2021-10-15

## 2021-10-15 RX ORDER — ASPIRIN 325 MG
325 TABLET ORAL
Status: COMPLETED | OUTPATIENT
Start: 2021-10-15 | End: 2021-10-15

## 2021-10-15 RX ORDER — ERGOCALCIFEROL 1.25 MG/1
50000 CAPSULE ORAL
Status: ON HOLD | COMMUNITY
Start: 2021-04-13 | End: 2021-10-16 | Stop reason: HOSPADM

## 2021-10-15 RX ADMIN — MORPHINE SULFATE 2 MG: 2 INJECTION, SOLUTION INTRAMUSCULAR; INTRAVENOUS at 11:10

## 2021-10-15 RX ADMIN — SODIUM CHLORIDE, SODIUM LACTATE, POTASSIUM CHLORIDE, AND CALCIUM CHLORIDE 1000 ML: .6; .31; .03; .02 INJECTION, SOLUTION INTRAVENOUS at 11:10

## 2021-10-15 RX ADMIN — ASPIRIN 325 MG ORAL TABLET 325 MG: 325 PILL ORAL at 10:10

## 2021-10-16 ENCOUNTER — HOSPITAL ENCOUNTER (OUTPATIENT)
Dept: RADIOLOGY | Facility: HOSPITAL | Age: 61
Discharge: HOME | End: 2021-10-16
Payer: COMMERCIAL

## 2021-10-16 ENCOUNTER — CLINICAL SUPPORT (OUTPATIENT)
Dept: CARDIOLOGY | Facility: HOSPITAL | Age: 61
End: 2021-10-16
Payer: COMMERCIAL

## 2021-10-16 VITALS
SYSTOLIC BLOOD PRESSURE: 125 MMHG | TEMPERATURE: 100 F | RESPIRATION RATE: 18 BRPM | BODY MASS INDEX: 34.85 KG/M2 | OXYGEN SATURATION: 93 % | HEIGHT: 68 IN | WEIGHT: 229.94 LBS | DIASTOLIC BLOOD PRESSURE: 71 MMHG | HEART RATE: 91 BPM | HEIGHT: 68 IN | WEIGHT: 229.94 LBS | BODY MASS INDEX: 34.85 KG/M2

## 2021-10-16 PROBLEM — R07.9 CHEST PAIN: Status: ACTIVE | Noted: 2021-10-16

## 2021-10-16 PROBLEM — R07.9 CHEST PAIN: Status: RESOLVED | Noted: 2021-10-16 | Resolved: 2021-10-16

## 2021-10-16 LAB
ANION GAP SERPL CALC-SCNC: 9 MMOL/L (ref 8–16)
BASOPHILS # BLD AUTO: 0.02 K/UL (ref 0–0.2)
BASOPHILS NFR BLD: 0.2 % (ref 0–1.9)
BUN SERPL-MCNC: 17 MG/DL (ref 6–20)
CALCIUM SERPL-MCNC: 8.6 MG/DL (ref 8.7–10.5)
CHLORIDE SERPL-SCNC: 102 MMOL/L (ref 95–110)
CO2 SERPL-SCNC: 26 MMOL/L (ref 23–29)
CREAT SERPL-MCNC: 1.3 MG/DL (ref 0.5–1.4)
DIFFERENTIAL METHOD: ABNORMAL
EOSINOPHIL # BLD AUTO: 0 K/UL (ref 0–0.5)
EOSINOPHIL NFR BLD: 0.1 % (ref 0–8)
ERYTHROCYTE [DISTWIDTH] IN BLOOD BY AUTOMATED COUNT: 13.2 % (ref 11.5–14.5)
EST. GFR  (AFRICAN AMERICAN): >60 ML/MIN/1.73 M^2
EST. GFR  (NON AFRICAN AMERICAN): 59.7 ML/MIN/1.73 M^2
GLUCOSE SERPL-MCNC: 116 MG/DL (ref 70–110)
HCT VFR BLD AUTO: 40 % (ref 40–54)
HGB BLD-MCNC: 13.2 G/DL (ref 14–18)
IMM GRANULOCYTES # BLD AUTO: 0.05 K/UL (ref 0–0.04)
IMM GRANULOCYTES NFR BLD AUTO: 0.4 % (ref 0–0.5)
LYMPHOCYTES # BLD AUTO: 2.2 K/UL (ref 1–4.8)
LYMPHOCYTES NFR BLD: 17 % (ref 18–48)
MAGNESIUM SERPL-MCNC: 1.7 MG/DL (ref 1.6–2.6)
MCH RBC QN AUTO: 30.6 PG (ref 27–31)
MCHC RBC AUTO-ENTMCNC: 33 G/DL (ref 32–36)
MCV RBC AUTO: 93 FL (ref 82–98)
MONOCYTES # BLD AUTO: 0.8 K/UL (ref 0.3–1)
MONOCYTES NFR BLD: 6 % (ref 4–15)
NEUTROPHILS # BLD AUTO: 10.1 K/UL (ref 1.8–7.7)
NEUTROPHILS NFR BLD: 76.3 % (ref 38–73)
NRBC BLD-RTO: 0 /100 WBC
PLATELET # BLD AUTO: 229 K/UL (ref 150–450)
PMV BLD AUTO: 9.5 FL (ref 9.2–12.9)
POTASSIUM SERPL-SCNC: 3.9 MMOL/L (ref 3.5–5.1)
RBC # BLD AUTO: 4.32 M/UL (ref 4.6–6.2)
SARS-COV-2 RDRP RESP QL NAA+PROBE: NEGATIVE
SODIUM SERPL-SCNC: 137 MMOL/L (ref 136–145)
TROPONIN I SERPL DL<=0.01 NG/ML-MCNC: <0.03 NG/ML
WBC # BLD AUTO: 13.15 K/UL (ref 3.9–12.7)

## 2021-10-16 PROCEDURE — 80048 BASIC METABOLIC PNL TOTAL CA: CPT | Performed by: NURSE PRACTITIONER

## 2021-10-16 PROCEDURE — 93017 CV STRESS TEST TRACING ONLY: CPT

## 2021-10-16 PROCEDURE — 63600175 PHARM REV CODE 636 W HCPCS: Performed by: INTERNAL MEDICINE

## 2021-10-16 PROCEDURE — 93306 TTE W/DOPPLER COMPLETE: CPT

## 2021-10-16 PROCEDURE — 85025 COMPLETE CBC W/AUTO DIFF WBC: CPT | Performed by: NURSE PRACTITIONER

## 2021-10-16 PROCEDURE — G0378 HOSPITAL OBSERVATION PER HR: HCPCS

## 2021-10-16 PROCEDURE — A9502 TC99M TETROFOSMIN: HCPCS

## 2021-10-16 PROCEDURE — 84484 ASSAY OF TROPONIN QUANT: CPT | Mod: 91 | Performed by: NURSE PRACTITIONER

## 2021-10-16 PROCEDURE — 93016 NUCLEAR STRESS TEST (CUPID ONLY): ICD-10-PCS | Mod: ,,, | Performed by: INTERNAL MEDICINE

## 2021-10-16 PROCEDURE — 96361 HYDRATE IV INFUSION ADD-ON: CPT

## 2021-10-16 PROCEDURE — 93018 NUCLEAR STRESS TEST (CUPID ONLY): ICD-10-PCS | Mod: ,,, | Performed by: INTERNAL MEDICINE

## 2021-10-16 PROCEDURE — 84484 ASSAY OF TROPONIN QUANT: CPT | Mod: 91 | Performed by: EMERGENCY MEDICINE

## 2021-10-16 PROCEDURE — 25000003 PHARM REV CODE 250: Performed by: NURSE PRACTITIONER

## 2021-10-16 PROCEDURE — 87040 BLOOD CULTURE FOR BACTERIA: CPT | Mod: 59 | Performed by: NURSE PRACTITIONER

## 2021-10-16 PROCEDURE — 93016 CV STRESS TEST SUPVJ ONLY: CPT | Mod: ,,, | Performed by: INTERNAL MEDICINE

## 2021-10-16 PROCEDURE — 83735 ASSAY OF MAGNESIUM: CPT | Performed by: NURSE PRACTITIONER

## 2021-10-16 PROCEDURE — 36415 COLL VENOUS BLD VENIPUNCTURE: CPT | Performed by: NURSE PRACTITIONER

## 2021-10-16 PROCEDURE — 78452 HT MUSCLE IMAGE SPECT MULT: CPT | Mod: TC

## 2021-10-16 PROCEDURE — 93018 CV STRESS TEST I&R ONLY: CPT | Mod: ,,, | Performed by: INTERNAL MEDICINE

## 2021-10-16 RX ORDER — POTASSIUM CHLORIDE 20 MEQ/1
20 TABLET, EXTENDED RELEASE ORAL
Status: DISCONTINUED | OUTPATIENT
Start: 2021-10-16 | End: 2021-10-16 | Stop reason: HOSPADM

## 2021-10-16 RX ORDER — PANTOPRAZOLE SODIUM 40 MG/1
40 TABLET, DELAYED RELEASE ORAL DAILY
Status: DISCONTINUED | OUTPATIENT
Start: 2021-10-16 | End: 2021-10-16 | Stop reason: HOSPADM

## 2021-10-16 RX ORDER — POTASSIUM CHLORIDE 7.45 MG/ML
40 INJECTION INTRAVENOUS
Status: DISCONTINUED | OUTPATIENT
Start: 2021-10-16 | End: 2021-10-16 | Stop reason: HOSPADM

## 2021-10-16 RX ORDER — ACETAMINOPHEN 325 MG/1
650 TABLET ORAL EVERY 4 HOURS PRN
Status: DISCONTINUED | OUTPATIENT
Start: 2021-10-16 | End: 2021-10-16 | Stop reason: HOSPADM

## 2021-10-16 RX ORDER — POTASSIUM CHLORIDE 7.45 MG/ML
20 INJECTION INTRAVENOUS
Status: DISCONTINUED | OUTPATIENT
Start: 2021-10-16 | End: 2021-10-16 | Stop reason: HOSPADM

## 2021-10-16 RX ORDER — ENOXAPARIN SODIUM 100 MG/ML
40 INJECTION SUBCUTANEOUS EVERY 24 HOURS
Status: DISCONTINUED | OUTPATIENT
Start: 2021-10-16 | End: 2021-10-16 | Stop reason: HOSPADM

## 2021-10-16 RX ORDER — AMLODIPINE BESYLATE 5 MG/1
5 TABLET ORAL DAILY
Status: DISCONTINUED | OUTPATIENT
Start: 2021-10-16 | End: 2021-10-16 | Stop reason: HOSPADM

## 2021-10-16 RX ORDER — AMOXICILLIN 250 MG
1 CAPSULE ORAL 2 TIMES DAILY PRN
Status: DISCONTINUED | OUTPATIENT
Start: 2021-10-16 | End: 2021-10-16 | Stop reason: HOSPADM

## 2021-10-16 RX ORDER — MAGNESIUM SULFATE HEPTAHYDRATE 40 MG/ML
2 INJECTION, SOLUTION INTRAVENOUS
Status: DISCONTINUED | OUTPATIENT
Start: 2021-10-16 | End: 2021-10-16 | Stop reason: HOSPADM

## 2021-10-16 RX ORDER — LANOLIN ALCOHOL/MO/W.PET/CERES
800 CREAM (GRAM) TOPICAL
Status: DISCONTINUED | OUTPATIENT
Start: 2021-10-16 | End: 2021-10-16 | Stop reason: HOSPADM

## 2021-10-16 RX ORDER — POTASSIUM CHLORIDE 20 MEQ/1
40 TABLET, EXTENDED RELEASE ORAL
Status: DISCONTINUED | OUTPATIENT
Start: 2021-10-16 | End: 2021-10-16 | Stop reason: HOSPADM

## 2021-10-16 RX ORDER — LOSARTAN POTASSIUM 50 MG/1
100 TABLET ORAL DAILY
Status: DISCONTINUED | OUTPATIENT
Start: 2021-10-16 | End: 2021-10-16 | Stop reason: HOSPADM

## 2021-10-16 RX ORDER — MAGNESIUM SULFATE 1 G/100ML
1 INJECTION INTRAVENOUS
Status: DISCONTINUED | OUTPATIENT
Start: 2021-10-16 | End: 2021-10-16 | Stop reason: HOSPADM

## 2021-10-16 RX ORDER — MAGNESIUM SULFATE HEPTAHYDRATE 40 MG/ML
4 INJECTION, SOLUTION INTRAVENOUS
Status: DISCONTINUED | OUTPATIENT
Start: 2021-10-16 | End: 2021-10-16 | Stop reason: HOSPADM

## 2021-10-16 RX ORDER — ONDANSETRON 4 MG/1
8 TABLET, ORALLY DISINTEGRATING ORAL EVERY 8 HOURS PRN
Status: DISCONTINUED | OUTPATIENT
Start: 2021-10-16 | End: 2021-10-16 | Stop reason: HOSPADM

## 2021-10-16 RX ORDER — NITROGLYCERIN 0.4 MG/1
0.4 TABLET SUBLINGUAL EVERY 5 MIN PRN
Status: DISCONTINUED | OUTPATIENT
Start: 2021-10-16 | End: 2021-10-16 | Stop reason: HOSPADM

## 2021-10-16 RX ORDER — ASPIRIN 325 MG
325 TABLET ORAL DAILY
Status: DISCONTINUED | OUTPATIENT
Start: 2021-10-16 | End: 2021-10-16 | Stop reason: HOSPADM

## 2021-10-16 RX ORDER — REGADENOSON 0.08 MG/ML
0.4 INJECTION, SOLUTION INTRAVENOUS ONCE
Status: COMPLETED | OUTPATIENT
Start: 2021-10-16 | End: 2021-10-16

## 2021-10-16 RX ORDER — SODIUM CHLORIDE 0.9 % (FLUSH) 0.9 %
10 SYRINGE (ML) INJECTION
Status: DISCONTINUED | OUTPATIENT
Start: 2021-10-16 | End: 2021-10-16 | Stop reason: HOSPADM

## 2021-10-16 RX ADMIN — MAGNESIUM OXIDE 800 MG: 400 TABLET ORAL at 06:10

## 2021-10-16 RX ADMIN — REGADENOSON 0.4 MG: 0.08 INJECTION, SOLUTION INTRAVENOUS at 09:10

## 2021-10-16 RX ADMIN — AMLODIPINE BESYLATE 5 MG: 5 TABLET ORAL at 10:10

## 2021-10-16 RX ADMIN — POTASSIUM CHLORIDE 20 MEQ: 1500 TABLET, EXTENDED RELEASE ORAL at 06:10

## 2021-10-16 RX ADMIN — PANTOPRAZOLE SODIUM 40 MG: 40 TABLET, DELAYED RELEASE ORAL at 06:10

## 2021-10-16 RX ADMIN — ASPIRIN 325 MG ORAL TABLET 325 MG: 325 PILL ORAL at 10:10

## 2021-10-17 LAB
AORTIC ROOT ANNULUS: 3.12 CM
AORTIC VALVE CUSP SEPERATION: 2.11 CM
AV INDEX (PROSTH): 0.86
AV MEAN GRADIENT: 6 MMHG
AV PEAK GRADIENT: 12 MMHG
AV VALVE AREA: 3.04 CM2
AV VELOCITY RATIO: 77.78
BSA FOR ECHO PROCEDURE: 2.24 M2
CV ECHO LV RWT: 0.34 CM
DOP CALC AO PEAK VEL: 1.76 M/S
DOP CALC AO VTI: 24.18 CM
DOP CALC LVOT AREA: 3.5 CM2
DOP CALC LVOT DIAMETER: 2.12 CM
DOP CALC LVOT PEAK VEL: 136.89 M/S
DOP CALC LVOT STROKE VOLUME: 73.6 CM3
DOP CALCLVOT PEAK VEL VTI: 20.86 CM
E WAVE DECELERATION TIME: 190.62 MSEC
E/A RATIO: 1.08
E/E' RATIO: 12.93 M/S
ECHO LV POSTERIOR WALL: 0.92 CM (ref 0.6–1.1)
EJECTION FRACTION: 65 %
FRACTIONAL SHORTENING: 29 % (ref 28–44)
INTERVENTRICULAR SEPTUM: 0.92 CM (ref 0.6–1.1)
IVRT: 74.59 MSEC
LEFT ATRIUM SIZE: 4.43 CM
LEFT INTERNAL DIMENSION IN SYSTOLE: 3.79 CM (ref 2.1–4)
LEFT VENTRICLE MASS INDEX: 84 G/M2
LEFT VENTRICULAR INTERNAL DIMENSION IN DIASTOLE: 5.34 CM (ref 3.5–6)
LEFT VENTRICULAR MASS: 181.87 G
LV LATERAL E/E' RATIO: 10.78 M/S
LV SEPTAL E/E' RATIO: 16.17 M/S
MV PEAK A VEL: 0.9 M/S
MV PEAK E VEL: 0.97 M/S
PISA TR MAX VEL: 2.98 M/S
PV PEAK VELOCITY: 138.11 CM/S
RA PRESSURE: 8 MMHG
RIGHT VENTRICULAR END-DIASTOLIC DIMENSION: 279 CM
TDI LATERAL: 0.09 M/S
TDI SEPTAL: 0.06 M/S
TDI: 0.08 M/S
TR MAX PG: 36 MMHG
TV REST PULMONARY ARTERY PRESSURE: 44 MMHG

## 2021-10-18 LAB
GLUCOSE SERPL-MCNC: 112 MG/DL (ref 70–110)
GLUCOSE SERPL-MCNC: 166 MG/DL (ref 70–110)

## 2021-10-21 LAB
BACTERIA BLD CULT: NORMAL
BACTERIA BLD CULT: NORMAL

## 2021-11-15 LAB
CV STRESS BASE HR: 100 BPM
DIASTOLIC BLOOD PRESSURE: 79 MMHG
OHS CV CPX 1 MINUTE RECOVERY HEART RATE: 115 BPM
OHS CV CPX 85 PERCENT MAX PREDICTED HEART RATE MALE: 137
OHS CV CPX MAX PREDICTED HEART RATE: 161
OHS CV CPX PATIENT IS FEMALE: 0
OHS CV CPX PATIENT IS MALE: 1
OHS CV CPX PEAK DIASTOLIC BLOOD PRESSURE: 86 MMHG
OHS CV CPX PEAK HEAR RATE: 116 BPM
OHS CV CPX PEAK RATE PRESSURE PRODUCT: NORMAL
OHS CV CPX PEAK SYSTOLIC BLOOD PRESSURE: 142 MMHG
OHS CV CPX PERCENT MAX PREDICTED HEART RATE ACHIEVED: 72
OHS CV CPX RATE PRESSURE PRODUCT PRESENTING: NORMAL
SYSTOLIC BLOOD PRESSURE: 127 MMHG

## 2022-03-23 ENCOUNTER — TELEPHONE (OUTPATIENT)
Dept: ORTHOPEDICS | Facility: CLINIC | Age: 62
End: 2022-03-23
Payer: OTHER MISCELLANEOUS

## 2022-03-23 DIAGNOSIS — Z96.652 H/O TOTAL KNEE REPLACEMENT, LEFT: Primary | ICD-10-CM

## 2022-03-23 NOTE — TELEPHONE ENCOUNTER
Spoke to patient, scheduled xray on 3/28/22@10:45am at [facility] for [employer] tech recall. .

## 2022-03-28 ENCOUNTER — TELEPHONE (OUTPATIENT)
Dept: ORTHOPEDICS | Facility: CLINIC | Age: 62
End: 2022-03-28
Payer: COMMERCIAL

## 2022-03-28 ENCOUNTER — HOSPITAL ENCOUNTER (OUTPATIENT)
Dept: RADIOLOGY | Facility: HOSPITAL | Age: 62
Discharge: HOME | End: 2022-03-28
Attending: NURSE PRACTITIONER
Payer: COMMERCIAL

## 2022-03-28 DIAGNOSIS — Z96.652 H/O TOTAL KNEE REPLACEMENT, LEFT: ICD-10-CM

## 2022-03-28 PROCEDURE — 73560 X-RAY EXAM OF KNEE 1 OR 2: CPT | Mod: TC,FY,RT

## 2022-03-28 PROCEDURE — 73560 XR KNEE ORTHO LEFT: ICD-10-PCS | Mod: 26,RT,, | Performed by: RADIOLOGY

## 2022-03-28 PROCEDURE — 73562 X-RAY EXAM OF KNEE 3: CPT | Mod: 26,LT,, | Performed by: RADIOLOGY

## 2022-03-28 PROCEDURE — 73562 XR KNEE ORTHO LEFT: ICD-10-PCS | Mod: 26,LT,, | Performed by: RADIOLOGY

## 2022-03-28 PROCEDURE — 73560 X-RAY EXAM OF KNEE 1 OR 2: CPT | Mod: 26,RT,, | Performed by: RADIOLOGY

## 2022-03-28 NOTE — TELEPHONE ENCOUNTER
Called patient and left a voicemail letting him know that Dr. Garcia and I reviewed his xray and he is fine from the [employer] recall standpoint. He can f/u as needed.

## 2022-07-14 ENCOUNTER — TELEPHONE (OUTPATIENT)
Dept: ORTHOPEDICS | Facility: CLINIC | Age: 62
End: 2022-07-14
Payer: COMMERCIAL

## 2022-07-14 NOTE — TELEPHONE ENCOUNTER
----- Message from Pk Dominguez sent at 7/14/2022  4:16 PM CDT -----  Contact: pt  Pt has spoke with billing already and has questions for provider      Please refer patient to the billing department. We don't have anything to do with billing.           Previous Messages       ----- Message -----   From: Pk Dominguez   Sent: 7/14/2022   4:02 PM CDT   To: [facility] Staff     Pt requesting call back RE: would like to speak with provider regarding an billing issue. Please call to discuss         Confirmed contact below:   Contact Name:John Snellortiz   Phone Number: [phone]

## 2022-07-14 NOTE — TELEPHONE ENCOUNTER
Returned call to patient and left a voicemail letting him know that, since we haven't seen him since 2017, I'm assuming that his billing question is related to the [employer] recall. I referred him to the number in the letter that he received as [employer] is handling all of those questions and we don't handle anything with billing in clinic.

## (undated) DEVICE — BANDAID STRIP PLASTIC 3/4X3